# Patient Record
Sex: FEMALE | Race: WHITE | Employment: OTHER | URBAN - METROPOLITAN AREA
[De-identification: names, ages, dates, MRNs, and addresses within clinical notes are randomized per-mention and may not be internally consistent; named-entity substitution may affect disease eponyms.]

---

## 2023-09-19 ENCOUNTER — HOSPITAL ENCOUNTER (OUTPATIENT)
Dept: DATA CONVERSION | Facility: HOSPITAL | Age: 68
Discharge: HOME | End: 2023-09-19

## 2023-09-19 DIAGNOSIS — M76.61 ACHILLES TENDINITIS, RIGHT LEG: ICD-10-CM

## 2023-09-19 DIAGNOSIS — M92.61 JUVENILE OSTEOCHONDROSIS OF TARSUS, RIGHT ANKLE: ICD-10-CM

## 2023-09-19 LAB
ALBUMIN SERPL-MCNC: 3.8 GM/DL (ref 3.5–5)
ALBUMIN/GLOB SERPL: 1 RATIO (ref 1.5–3)
ALP BLD-CCNC: 127 U/L (ref 35–125)
ALT SERPL-CCNC: 69 U/L (ref 5–40)
ANION GAP SERPL CALCULATED.3IONS-SCNC: 8 MMOL/L (ref 0–19)
ANTICOAGULANT: NORMAL
APTT PPP: 23.9 SEC (ref 22–32.5)
AST SERPL-CCNC: 69 U/L (ref 5–40)
BASOPHILS # BLD AUTO: 0.04 K/UL (ref 0–0.22)
BASOPHILS NFR BLD AUTO: 0.7 % (ref 0–1)
BILIRUB SERPL-MCNC: 1.1 MG/DL (ref 0.1–1.2)
BUN SERPL-MCNC: 9 MG/DL (ref 8–25)
BUN/CREAT SERPL: 10 RATIO (ref 8–21)
CALCIUM SERPL-MCNC: 9.4 MG/DL (ref 8.5–10.4)
CHLORIDE SERPL-SCNC: 103 MMOL/L (ref 97–107)
CO2 SERPL-SCNC: 28 MMOL/L (ref 24–31)
CREAT SERPL-MCNC: 0.9 MG/DL (ref 0.4–1.6)
DEPRECATED RDW RBC AUTO: 47.8 FL (ref 37–54)
DIFFERENTIAL METHOD BLD: ABNORMAL
EOSINOPHIL # BLD AUTO: 0.03 K/UL (ref 0–0.45)
EOSINOPHIL NFR BLD: 0.5 % (ref 0–3)
ERYTHROCYTE [DISTWIDTH] IN BLOOD BY AUTOMATED COUNT: 12.8 % (ref 11.7–15)
GFR SERPL CREATININE-BSD FRML MDRD: 71 ML/MIN/1.73 M2
GLOBULIN SER-MCNC: 3.7 G/DL (ref 1.9–3.7)
GLUCOSE SERPL-MCNC: 113 MG/DL (ref 65–99)
HCT VFR BLD AUTO: 46.2 % (ref 36–44)
HGB BLD-MCNC: 15.3 GM/DL (ref 12–15)
IMM GRANULOCYTES # BLD AUTO: 0 K/UL (ref 0–0.1)
INR PPP: 1 (ref 0.86–1.16)
LYMPHOCYTES # BLD AUTO: 1.46 K/UL (ref 1.2–3.2)
LYMPHOCYTES NFR BLD MANUAL: 24.3 % (ref 20–40)
MCH RBC QN AUTO: 33.2 PG (ref 26–34)
MCHC RBC AUTO-ENTMCNC: 33.1 % (ref 31–37)
MCV RBC AUTO: 100.2 FL (ref 80–100)
MONOCYTES # BLD AUTO: 0.49 K/UL (ref 0–0.8)
MONOCYTES NFR BLD MANUAL: 8.2 % (ref 0–8)
NEUTROPHILS # BLD AUTO: 3.99 K/UL
NEUTROPHILS # BLD AUTO: 3.99 K/UL (ref 1.8–7.7)
NEUTROPHILS.IMMATURE NFR BLD: 0 % (ref 0–1)
NEUTS SEG NFR BLD: 66.3 % (ref 50–70)
PLATELET # BLD AUTO: 176 K/UL (ref 150–450)
PMV BLD AUTO: 11.4 CU (ref 7–12.6)
POTASSIUM SERPL-SCNC: 4.7 MMOL/L (ref 3.4–5.1)
PROT SERPL-MCNC: 7.5 G/DL (ref 5.9–7.9)
PROTHROMBIN TIME: 10.1 SEC (ref 9.3–12.7)
RBC # BLD AUTO: 4.61 M/UL (ref 4–4.9)
SODIUM SERPL-SCNC: 139 MMOL/L (ref 133–145)
WBC # BLD AUTO: 6 K/UL (ref 4.5–11)

## 2023-09-22 ENCOUNTER — HOSPITAL ENCOUNTER (OUTPATIENT)
Dept: DATA CONVERSION | Facility: HOSPITAL | Age: 68
Discharge: HOME | End: 2023-09-22

## 2023-09-22 DIAGNOSIS — I25.10 ATHEROSCLEROTIC HEART DISEASE OF NATIVE CORONARY ARTERY WITHOUT ANGINA PECTORIS: ICD-10-CM

## 2023-09-22 DIAGNOSIS — E66.01 MORBID (SEVERE) OBESITY DUE TO EXCESS CALORIES (MULTI): ICD-10-CM

## 2023-09-22 DIAGNOSIS — E78.5 HYPERLIPIDEMIA, UNSPECIFIED: ICD-10-CM

## 2023-09-22 DIAGNOSIS — Z87.891 PERSONAL HISTORY OF NICOTINE DEPENDENCE: ICD-10-CM

## 2023-09-22 DIAGNOSIS — I10 ESSENTIAL (PRIMARY) HYPERTENSION: ICD-10-CM

## 2023-09-22 DIAGNOSIS — Z95.5 PRESENCE OF CORONARY ANGIOPLASTY IMPLANT AND GRAFT: ICD-10-CM

## 2023-09-22 DIAGNOSIS — M92.61 JUVENILE OSTEOCHONDROSIS OF TARSUS, RIGHT ANKLE: ICD-10-CM

## 2023-09-22 DIAGNOSIS — M76.61 ACHILLES TENDINITIS, RIGHT LEG: ICD-10-CM

## 2023-09-22 DIAGNOSIS — Z91.041 RADIOGRAPHIC DYE ALLERGY STATUS: ICD-10-CM

## 2023-09-22 DIAGNOSIS — Z79.82 LONG TERM (CURRENT) USE OF ASPIRIN: ICD-10-CM

## 2023-10-02 PROBLEM — R94.4 DECREASED GFR: Status: ACTIVE | Noted: 2023-10-02

## 2023-10-02 PROBLEM — L65.9 HAIR LOSS: Status: ACTIVE | Noted: 2023-10-02

## 2023-10-02 PROBLEM — M92.60 HAGLUND'S DEFORMITY: Status: ACTIVE | Noted: 2023-10-02

## 2023-10-02 PROBLEM — E66.9 CLASS 2 OBESITY: Status: ACTIVE | Noted: 2023-10-02

## 2023-10-02 PROBLEM — R80.9 MICROALBUMINURIA: Status: ACTIVE | Noted: 2023-10-02

## 2023-10-02 PROBLEM — E78.5 HYPERLIPIDEMIA: Status: ACTIVE | Noted: 2023-10-02

## 2023-10-02 PROBLEM — E66.812 CLASS 2 OBESITY: Status: ACTIVE | Noted: 2023-10-02

## 2023-10-02 PROBLEM — R53.83 FATIGUE: Status: ACTIVE | Noted: 2023-10-02

## 2023-10-02 PROBLEM — M19.011 PRIMARY OSTEOARTHRITIS OF RIGHT SHOULDER: Status: ACTIVE | Noted: 2023-10-02

## 2023-10-02 PROBLEM — E66.01 MORBID OBESITY (MULTI): Status: ACTIVE | Noted: 2023-10-02

## 2023-10-02 PROBLEM — M18.9 CMC ARTHRITIS, THUMB, DEGENERATIVE: Status: ACTIVE | Noted: 2023-10-02

## 2023-10-02 PROBLEM — E66.9 CLASS 2 OBESITY: Status: RESOLVED | Noted: 2023-10-02 | Resolved: 2023-10-02

## 2023-10-02 PROBLEM — I10 HYPERTENSION: Status: ACTIVE | Noted: 2023-10-02

## 2023-10-02 PROBLEM — L82.1 SEBORRHEIC KERATOSIS: Status: ACTIVE | Noted: 2023-10-02

## 2023-10-02 PROBLEM — E66.812 CLASS 2 OBESITY: Status: RESOLVED | Noted: 2023-10-02 | Resolved: 2023-10-02

## 2023-10-02 RX ORDER — ASPIRIN 81 MG/1
81 TABLET ORAL EVERY 12 HOURS
COMMUNITY

## 2023-10-02 RX ORDER — ONDANSETRON 4 MG/1
4 TABLET, FILM COATED ORAL EVERY 6 HOURS PRN
COMMUNITY
End: 2023-10-04 | Stop reason: ALTCHOICE

## 2023-10-02 RX ORDER — LISINOPRIL 30 MG/1
1 TABLET ORAL DAILY
COMMUNITY
Start: 2018-09-10 | End: 2023-12-05 | Stop reason: WASHOUT

## 2023-10-02 RX ORDER — CARVEDILOL 6.25 MG/1
1 TABLET ORAL 2 TIMES DAILY
COMMUNITY
Start: 2018-09-10 | End: 2024-03-26 | Stop reason: SDUPTHER

## 2023-10-02 RX ORDER — ATORVASTATIN CALCIUM 40 MG/1
1 TABLET, FILM COATED ORAL NIGHTLY
COMMUNITY
Start: 2018-09-10 | End: 2024-05-08 | Stop reason: SDUPTHER

## 2023-10-02 RX ORDER — ACETAMINOPHEN 500 MG
1000 TABLET ORAL EVERY 8 HOURS PRN
COMMUNITY

## 2023-10-04 ENCOUNTER — OFFICE VISIT (OUTPATIENT)
Dept: ORTHOPEDIC SURGERY | Facility: CLINIC | Age: 68
End: 2023-10-04
Payer: MEDICARE

## 2023-10-04 VITALS — BODY MASS INDEX: 43.43 KG/M2 | HEIGHT: 62 IN | WEIGHT: 236 LBS

## 2023-10-04 DIAGNOSIS — M92.60 HAGLUND'S DEFORMITY: ICD-10-CM

## 2023-10-04 PROCEDURE — 99024 POSTOP FOLLOW-UP VISIT: CPT | Performed by: STUDENT IN AN ORGANIZED HEALTH CARE EDUCATION/TRAINING PROGRAM

## 2023-10-04 NOTE — LETTER
October 5, 2023     Faviola Meier MD  [address] [facility] [address]  [place] [address]    Patient: Kathryn Araujo   YOB: 1955   Date of Visit: 10/4/2023       Dear Dr. Faviola Meier MD:    Thank you for referring Kathryn Araujo to me for evaluation. Below are my notes for this consultation.  If you have questions, please do not hesitate to call me. I look forward to following your patient along with you.       Sincerely,     Salvatore Ivey MD      CC: No Recipients  ______________________________________________________________________________________    PRIMARY CARE PHYSICIAN: Faviola Meier MD    ORTHOPAEDIC POSTOPERATIVE VISIT    ASSESSMENT & PLAN    Impression: 68 y.o. 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    Plan:   Overall she is doing very well.  She will be transition to a cam walking boot with wedges today.  She will begin the postoperative protocol for the above.  She understands her postoperative precautions.  She will ice and elevate.  She will modify her weightbearing per the postoperative protocol (nonweightbearing for now).    I reviewed the intraoperative findings with the patient and answered all their questions. I reviewed their postoperative timeline and plan with them. They understand the postoperative precautions and the treatment plan going forward.     Follow-Up: Patient will follow-up 4 weeks, no x-rays    At the end of the visit, all questions were answered in full. The patient is in agreement with the plan and recommendations. They will call the office with any questions/concerns.      Note dictated with [employer] software. Completed without full typed error editing and sent to avoid delay.      SUBJECTIVE    HPI: Kathryn Araujo is a 68 y.o. patient. 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  She denies any pain and has been  adhering to her postoperative precautions.    REVIEW OF SYSTEMS  Constitutional: See HPI for pain assessment, No significant weight loss, recent trauma  Cardiovascular: No chest pain, shortness of breath  Respiratory: No difficulty breathing, cough  Gastrointestinal: No nausea, vomiting, diarrhea, constipation  Musculoskeletal: Noted in HPI, positive for pain, restricted motion, stiffness  Integumentary: No rashes, easy bruising, redness   Neurological: no numbness or tingling in extremities, no gait disturbances   Psychiatric: No mood changes, memory changes, social issues  Heme/Lymph: no excessive swelling, easy bruising, excessive bleeding  ENT: No hearing changes  Eyes: No vision changes    No past medical history on file.     No Known Allergies     Past Surgical History:   Procedure Laterality Date   • ACHILLES TENDON SURGERY     • BREAST LUMPECTOMY Right    • CATARACT EXTRACTION  09/10/2018    Cataract Surgery   •  SECTION, CLASSIC  09/10/2018     Section        No family history on file.     Social History     Socioeconomic History   • Marital status:      Spouse name: Not on file   • Number of children: Not on file   • Years of education: Not on file   • Highest education level: Not on file   Occupational History   • Not on file   Tobacco Use   • Smoking status: Former     Types: Cigarettes   • Smokeless tobacco: Never   Substance and Sexual Activity   • Alcohol use: Yes   • Drug use: Never   • Sexual activity: Not on file   Other Topics Concern   • Not on file   Social History Narrative   • Not on file     Social Determinants of Health     Financial Resource Strain: Not on file   Food Insecurity: Not on file   Transportation Needs: Not on file   Physical Activity: Not on file   Stress: Not on file   Social Connections: Not on file   Intimate Partner Violence: Not on file   Housing Stability: Not on file        CURRENT MEDICATIONS:   Current Outpatient Medications   Medication Sig  "Dispense Refill   • acetaminophen (Tylenol) 500 mg tablet Take 2 tablets (1,000 mg) by mouth every 8 hours if needed for mild pain (1 - 3).     • aspirin 81 mg EC tablet Take 1 tablet (81 mg) by mouth every 12 hours.     • atorvastatin (Lipitor) 40 mg tablet Take 1 tablet (40 mg) by mouth once daily at bedtime.     • carvedilol (Coreg) 6.25 mg tablet Take 1 tablet (6.25 mg) by mouth 2 times a day.     • lisinopril 30 mg tablet Take 1 tablet (30 mg) by mouth once daily.       No current facility-administered medications for this visit.        OBJECTIVE    PHYSICAL EXAM      6/1/2021    10:42 AM 11/10/2021     8:16 AM 4/13/2022     7:44 AM 10/31/2022     2:20 PM 12/5/2022     7:39 AM 7/24/2023     1:21 PM 10/4/2023     2:43 PM   Vitals   Systolic 128 140 124 122  142    Diastolic 74 80 82 64  101    Heart Rate 71 78 79 97  78    Temp 36.2 °C (97.1 °F) 36.3 °C (97.3 °F) 35.6 °C (96 °F) 36.2 °C (97.2 °F)      Resp  18        Height (in) 1.568 m (5' 1.75\") 1.588 m (5' 2.5\") 1.588 m (5' 2.5\") 1.588 m (5' 2.5\") 1.574 m (5' 1.97\")  1.575 m (5' 2\")   Weight (lb) 180 205.5 219.5 226.25 222.66  236   BMI 33.19 kg/m2 36.99 kg/m2 39.51 kg/m2 40.72 kg/m2 40.77 kg/m2  43.16 kg/m2   BSA (m2) 1.89 m2 2.03 m2 2.1 m2 2.13 m2 2.1 m2  2.16 m2   Visit Report       Report      Body mass index is 43.16 kg/m².    General: Well-appearing, no acute distress    Skin intact bilateral upper and lower extremities  No erythema  No warmth    Detailed examination of right ankle demonstrates:  Surgical incisions healing well, Steri-Strips in place  No erythema or warmth  No drainage  No ecchymosis  Resolving swelling, minimal  Gentle ankle range of motion intact with dorsiflexion to approximately 10 degrees short of neutral without significant tension  Achilles tendon is palpable and intact  Negative Gutierrez test  Lower extremity motor grossly intact  L4-S1 sensation intact bilaterally  2+ DP/PT pulses bilaterally  Warm and well-perfused, brisk " capillary refill    IMAGING:   No new imaging      Salvatore Ivey MD  Attending Surgeon    Sports Medicine Orthopaedic Surgery  [facility]

## 2023-10-05 NOTE — PROGRESS NOTES
PRIMARY CARE PHYSICIAN: Faviola Meier MD    ORTHOPAEDIC POSTOPERATIVE VISIT    ASSESSMENT & PLAN    Impression: 68 y.o. 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    Plan:   Overall she is doing very well.  She will be transition to a cam walking boot with wedges today.  She will begin the postoperative protocol for the above.  She understands her postoperative precautions.  She will ice and elevate.  She will modify her weightbearing per the postoperative protocol (nonweightbearing for now).    I reviewed the intraoperative findings with the patient and answered all their questions. I reviewed their postoperative timeline and plan with them. They understand the postoperative precautions and the treatment plan going forward.     Follow-Up: Patient will follow-up 4 weeks, no x-rays    At the end of the visit, all questions were answered in full. The patient is in agreement with the plan and recommendations. They will call the office with any questions/concerns.      Note dictated with [employer]X software. Completed without full typed error editing and sent to avoid delay.      SUBJECTIVE    HPI: Kathryn Araujo is a 68 y.o. patient. 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  She denies any pain and has been adhering to her postoperative precautions.    REVIEW OF SYSTEMS  Constitutional: See HPI for pain assessment, No significant weight loss, recent trauma  Cardiovascular: No chest pain, shortness of breath  Respiratory: No difficulty breathing, cough  Gastrointestinal: No nausea, vomiting, diarrhea, constipation  Musculoskeletal: Noted in HPI, positive for pain, restricted motion, stiffness  Integumentary: No rashes, easy bruising, redness   Neurological: no numbness or tingling in extremities, no gait disturbances   Psychiatric: No mood changes, memory changes, social  issues  Heme/Lymph: no excessive swelling, easy bruising, excessive bleeding  ENT: No hearing changes  Eyes: No vision changes    No past medical history on file.     No Known Allergies     Past Surgical History:   Procedure Laterality Date    ACHILLES TENDON SURGERY      BREAST LUMPECTOMY Right     CATARACT EXTRACTION  09/10/2018    Cataract Surgery     SECTION, CLASSIC  09/10/2018     Section        No family history on file.     Social History     Socioeconomic History    Marital status:      Spouse name: Not on file    Number of children: Not on file    Years of education: Not on file    Highest education level: Not on file   Occupational History    Not on file   Tobacco Use    Smoking status: Former     Types: Cigarettes    Smokeless tobacco: Never   Substance and Sexual Activity    Alcohol use: Yes    Drug use: Never    Sexual activity: Not on file   Other Topics Concern    Not on file   Social History Narrative    Not on file     Social Determinants of Health     Financial Resource Strain: Not on file   Food Insecurity: Not on file   Transportation Needs: Not on file   Physical Activity: Not on file   Stress: Not on file   Social Connections: Not on file   Intimate Partner Violence: Not on file   Housing Stability: Not on file        CURRENT MEDICATIONS:   Current Outpatient Medications   Medication Sig Dispense Refill    acetaminophen (Tylenol) 500 mg tablet Take 2 tablets (1,000 mg) by mouth every 8 hours if needed for mild pain (1 - 3).      aspirin 81 mg EC tablet Take 1 tablet (81 mg) by mouth every 12 hours.      atorvastatin (Lipitor) 40 mg tablet Take 1 tablet (40 mg) by mouth once daily at bedtime.      carvedilol (Coreg) 6.25 mg tablet Take 1 tablet (6.25 mg) by mouth 2 times a day.      lisinopril 30 mg tablet Take 1 tablet (30 mg) by mouth once daily.       No current facility-administered medications for this visit.        OBJECTIVE    PHYSICAL EXAM      2021    10:42  "AM 11/10/2021     8:16 AM 4/13/2022     7:44 AM 10/31/2022     2:20 PM 12/5/2022     7:39 AM 7/24/2023     1:21 PM 10/4/2023     2:43 PM   Vitals   Systolic 128 140 124 122  142    Diastolic 74 80 82 64  101    Heart Rate 71 78 79 97  78    Temp 36.2 °C (97.1 °F) 36.3 °C (97.3 °F) 35.6 °C (96 °F) 36.2 °C (97.2 °F)      Resp  18        Height (in) 1.568 m (5' 1.75\") 1.588 m (5' 2.5\") 1.588 m (5' 2.5\") 1.588 m (5' 2.5\") 1.574 m (5' 1.97\")  1.575 m (5' 2\")   Weight (lb) 180 205.5 219.5 226.25 222.66  236   BMI 33.19 kg/m2 36.99 kg/m2 39.51 kg/m2 40.72 kg/m2 40.77 kg/m2  43.16 kg/m2   BSA (m2) 1.89 m2 2.03 m2 2.1 m2 2.13 m2 2.1 m2  2.16 m2   Visit Report       Report      Body mass index is 43.16 kg/m².    General: Well-appearing, no acute distress    Skin intact bilateral upper and lower extremities  No erythema  No warmth    Detailed examination of right ankle demonstrates:  Surgical incisions healing well, Steri-Strips in place  No erythema or warmth  No drainage  No ecchymosis  Resolving swelling, minimal  Gentle ankle range of motion intact with dorsiflexion to approximately 10 degrees short of neutral without significant tension  Achilles tendon is palpable and intact  Negative Gutierrez test  Lower extremity motor grossly intact  L4-S1 sensation intact bilaterally  2+ DP/PT pulses bilaterally  Warm and well-perfused, brisk capillary refill    IMAGING:   No new imaging      Salvatore Ivey MD  Attending Surgeon    Sports Medicine Orthopaedic Surgery  [facility]         "

## 2023-10-18 ENCOUNTER — EVALUATION (OUTPATIENT)
Dept: PHYSICAL THERAPY | Facility: CLINIC | Age: 68
End: 2023-10-18
Payer: MEDICARE

## 2023-10-18 DIAGNOSIS — R26.9 ALTERED GAIT: ICD-10-CM

## 2023-10-18 DIAGNOSIS — M92.60 HAGLUND'S DEFORMITY: ICD-10-CM

## 2023-10-18 DIAGNOSIS — M79.671 PAIN OF RIGHT HEEL: Primary | ICD-10-CM

## 2023-10-18 PROCEDURE — 97110 THERAPEUTIC EXERCISES: CPT | Mod: GP | Performed by: PHYSICAL THERAPIST

## 2023-10-18 PROCEDURE — 97161 PT EVAL LOW COMPLEX 20 MIN: CPT | Mod: GP | Performed by: PHYSICAL THERAPIST

## 2023-10-18 ASSESSMENT — ENCOUNTER SYMPTOMS
OCCASIONAL FEELINGS OF UNSTEADINESS: 1
LOSS OF SENSATION IN FEET: 0
DEPRESSION: 0

## 2023-10-18 ASSESSMENT — PAIN - FUNCTIONAL ASSESSMENT: PAIN_FUNCTIONAL_ASSESSMENT: 0-10

## 2023-10-18 ASSESSMENT — PATIENT HEALTH QUESTIONNAIRE - PHQ9
2. FEELING DOWN, DEPRESSED OR HOPELESS: NOT AT ALL
1. LITTLE INTEREST OR PLEASURE IN DOING THINGS: NOT AT ALL
SUM OF ALL RESPONSES TO PHQ9 QUESTIONS 1 AND 2: 0

## 2023-10-18 ASSESSMENT — PAIN SCALES - GENERAL: PAINLEVEL_OUTOF10: 2

## 2023-10-18 NOTE — Clinical Note
October 18, 2023     Patient: Kathryn Araujo   YOB: 1955   Date of Visit: 10/18/2023       To Whom it May Concern:    Kathryn Araujo was seen in my clinic on 10/18/2023. She {Return to school/sport:39684}.    If you have any questions or concerns, please don't hesitate to call.         Sincerely,          Ann Contreras, PT        CC: No Recipients

## 2023-10-18 NOTE — PROGRESS NOTES
"Physical Therapy    Physical Therapy Evaluation and Treatment      Patient Name: Kathryn Araujo  MRN: [id number]  Today's Date: 10/18/2023  Time Calculation  Start Time: 1010  Stop Time: 1110  Time Calculation (min): 60 min      Assessment:   Pt presents with decreased right ankle ROM and strength and altered gait following calcaneus excision and achilles repair. Pt needs skilled PT to address above problems.    Plan:  Treatment/Interventions: Cryotherapy, Education/ Instruction, Gait training, Manual therapy, Therapeutic exercises  PT Plan: Skilled PT  PT Frequency: 2 times per week  Duration: 12-20 weks  Onset Date: 09/22/23  Certification Period Start Date: 10/18/23  Certification Period End Date: 01/16/24  Rehab Potential: Excellent  Plan of Care Agreement: Patient    Current Problem:   1. Pain of right heel        2. Benito's deformity  Referral to Physical Therapy    Follow Up In Physical Therapy      3. Altered gait            Subjective    Pt presents s/p right calcaneus excision of Hagland's deformity with achilles repair. Pt has been NWB in boot using scooter for mobility.  Patient's goal: \"Walk without walker or any other device\"  General  Referred By: Dr. Ivey  Precautions:  Precautions  STEADI Fall Risk Score (The score of 4 or more indicates an increased risk of falling): 5  LE Weight Bearing Status: Right Partial Weight Bearing  Post-Surgical Precautions: Other (comment)  Splinting: see Dr. Ivey achilles tendon repair protocol       Pain:  Pain Assessment  Pain Assessment: 0-10  Pain Score: 2 (ranges from 0-2/10)  Pain Location:  (right achilles)  Home Living:   Pt lives with  in 2 story home. Bedroom and bathroom on 2nd floor. Pt is sitting on stairs to ascend/descend.  Prior Level of Function:   Independent    Objective   Cognition:   WNL      Gait  Pt instructed in PWB in boot with 4 heel pads using walker. Pt reported no pain with PWB in boot.       ANKLE     Ankle AROM WFL unless " "documented below  R ankle dorsiflexion: (10°): 0  R ankle plantarflexion: (40°): 40 (resting position)    Right knee AROM  0-130 degrees    Right knee strength  Quads 5/5  Hamstrings 5/5       Outcome Measures:  LEFS 16/80    Treatments:  EXERCISES Date10/18/23 Date Date Date    REPS REPS REPS REPS   Toe spreading 10 X 2      Toe curls 10 X 2      Towel scrunches with toes 10X      Right knee ROM-heel slide 20X      Quad set  20 X 5\"      SLR 10  X      Seated LAQ 10 X 2             Light ankle DF within confines of ROM restriction              Gait training in boot-4 heel pads-PWB Walker 80'                                                                                                                                    CP right ankle 10 mins      HEP                OP EDUCATION:   Access Code: [email or web address]  URL: [email or web address]  Date: 10/18/2023  Prepared by: Vivi Contreras    Exercises  - Toe Spreading  - 2 x daily - 7 x weekly - 1 sets - 10 reps  - Seated Toe Curl  - 2 x daily - 7 x weekly - 2 sets - 10 reps  - Towel Scrunches  - 1-2 x daily - 7 x weekly - 1 sets - 10 reps  - Seated Long Arc Quad  - 1-2 x daily - 7 x weekly - 2 sets - 10 reps  - Supine Heel Slide  - 1 x daily - 7 x weekly - 2 sets - 10 reps  - Small Range Straight Leg Raise (Mirrored)  - 1 x daily - 7 x weekly - 2 sets - 10 reps    Goals:  1.Progress to FWB in boot and wean heel lifts in boot per protocol-6 weeks    2.Discontinue boot and gradually wean into regular supportive shoe-12 weeks    3.Full PROM of right hdwifooj-6-60 weeks    4.Improve LEFS score > 40 to facilitate return to prior level of function-12-16 weeks    5.Pt will be able to ambulate community distances without deviation or pain-12 weeks        "

## 2023-10-18 NOTE — Clinical Note
October 18, 2023     Patient: Kathryn Araujo   YOB: 1955   Date of Visit: 10/18/2023       To Whom It May Concern:    It is my medical opinion that Kathryn Araujo {Work release (duty restriction):66478}.    If you have any questions or concerns, please don't hesitate to call.         Sincerely,        Ann Contreras, PT    CC: No Recipients

## 2023-10-20 ENCOUNTER — TREATMENT (OUTPATIENT)
Dept: PHYSICAL THERAPY | Facility: CLINIC | Age: 68
End: 2023-10-20
Payer: MEDICARE

## 2023-10-20 DIAGNOSIS — R26.9 ALTERED GAIT: ICD-10-CM

## 2023-10-20 DIAGNOSIS — M92.60 HAGLUND'S DEFORMITY: ICD-10-CM

## 2023-10-20 DIAGNOSIS — M79.671 PAIN OF RIGHT HEEL: Primary | ICD-10-CM

## 2023-10-20 PROCEDURE — 97110 THERAPEUTIC EXERCISES: CPT | Mod: GP,CQ

## 2023-10-20 PROCEDURE — 97140 MANUAL THERAPY 1/> REGIONS: CPT | Mod: GP,CQ

## 2023-10-20 ASSESSMENT — PAIN - FUNCTIONAL ASSESSMENT: PAIN_FUNCTIONAL_ASSESSMENT: 0-10

## 2023-10-20 ASSESSMENT — PAIN SCALES - GENERAL: PAINLEVEL_OUTOF10: 2

## 2023-10-20 ASSESSMENT — PAIN DESCRIPTION - DESCRIPTORS: DESCRIPTORS: TIGHTNESS

## 2023-10-20 NOTE — PROGRESS NOTES
"Physical Therapy    Physical Therapy Treatment    Patient Name: Kathryn Araujo  MRN: [id number]  : 1955  Today's Date: 10/20/2023  Time Calculation  Start Time: 1000  Stop Time: 1050  Time Calculation (min): 50 min    Visit: 2  Visit limit: 12   Authorization: 10/20/2023 - 2023.    Assessment:   Patient performed added exercises without complaints of increased right ankle or lower extremity symptoms. Patient presented with increases in right ankle range of motion measurements since last recorded measures. Patient indicates and demonstrates compliance with home exercise program.     Plan:   Continue with right ankle and lower extremity flexibility and strengthening program progressing as tolerated to decrease pain with standing and weight bearing activities.     Patient RTD 2023.  Reassessment needed by 2023.    Current Problem  1. Pain of right heel        2. Benito's deformity  Follow Up In Physical Therapy      3. Altered gait            Subjective   Patient reports no complaints of increased right ankle or lower extremity symptoms with daily activities after last treatment. Patient indicates continues to work on home exercise program daily.      Precautions  Precautions  Precautions Comment: Post Op protocol    Pain  Pain Assessment: 0-10  Pain Score: 2  Pain Location:  (Achilles)  Pain Orientation: Right  Pain Descriptors: Tightness    Objective   Added exercises. See exercise log for specifics.     AROM right ankle  Dorsiflexion = 0 degrees  Plantarflexion = 40 degrees  Inversion = 20 degrees  Eversion = 10 degrees      Outcome Measures:  Other Measures  Lower Extremity Funtional Score (LEFS): 16/80 (At initial evaluation)    Treatments:  EXERCISES Date10/18/23 Date   10/20/2023 Date Date    REPS REPS REPS REPS   Toe spreading 10 X 2 10 x 2     Toe curls 10 X 2 10 x 2     Towel scrunches with toes 10X 20 x     Right knee ROM-heel slide 20X      Quad set  20 X 5\"      SLR 10  X 10 x   " "  Seated LAQ 10 X 2 20 x            Light ankle DF within confines of ROM restriction  10x             Gait training in boot-4 heel pads-PWB Walker 80' ' x 2            Stairs (4)  3 x            Isometric DF, INV, EVR  5\"H x 10 each                                                      PROM right ankle  10 minutes                                               CP right ankle 10 mins -----     HEP  Reviewed           OP EDUCATION:       Goals:   1.Progress to FWB in boot and wean heel lifts in boot per protocol-6 weeks    2.Discontinue boot and gradually wean into regular supportive shoe-12 weeks    3.Full PROM of right syqygmwh-1-72 weeks   10/20/2023 progressing    4.Improve LEFS score > 40 to facilitate return to prior level of function-12-16 weeks    5.Pt will be able to ambulate community distances without deviation or pain-12 weeks    10/20/2023 progressing  "

## 2023-10-23 ENCOUNTER — DOCUMENTATION (OUTPATIENT)
Dept: PHYSICAL THERAPY | Facility: CLINIC | Age: 68
End: 2023-10-23
Payer: MEDICARE

## 2023-10-23 ENCOUNTER — APPOINTMENT (OUTPATIENT)
Dept: PHYSICAL THERAPY | Facility: CLINIC | Age: 68
End: 2023-10-23
Payer: MEDICARE

## 2023-10-23 NOTE — PROGRESS NOTES
Physical Therapy                 Therapy Communication Note    Patient Name: Kathryn Araujo  MRN: [id number]  Today's Date: 10/23/2023     Discipline: Physical Therapy    Missed Visit Reason:  Schedule conflict    Missed Time: Cancel    Comment:

## 2023-10-27 ENCOUNTER — TREATMENT (OUTPATIENT)
Dept: PHYSICAL THERAPY | Facility: CLINIC | Age: 68
End: 2023-10-27
Payer: MEDICARE

## 2023-10-27 DIAGNOSIS — R26.9 ALTERED GAIT: ICD-10-CM

## 2023-10-27 DIAGNOSIS — M79.671 PAIN OF RIGHT HEEL: Primary | ICD-10-CM

## 2023-10-27 DIAGNOSIS — M92.60 HAGLUND'S DEFORMITY: ICD-10-CM

## 2023-10-27 PROCEDURE — 97140 MANUAL THERAPY 1/> REGIONS: CPT | Mod: GP,CQ

## 2023-10-27 PROCEDURE — 97110 THERAPEUTIC EXERCISES: CPT | Mod: GP,CQ

## 2023-10-27 ASSESSMENT — PAIN DESCRIPTION - DESCRIPTORS: DESCRIPTORS: TIGHTNESS

## 2023-10-27 ASSESSMENT — PAIN - FUNCTIONAL ASSESSMENT: PAIN_FUNCTIONAL_ASSESSMENT: 0-10

## 2023-10-27 NOTE — PROGRESS NOTES
"Physical Therapy    Physical Therapy Treatment    Patient Name: Kathryn Araujo  MRN: [id number]  : 1955  Today's Date: 10/27/2023  Time Calculation  Start Time: 0845  Stop Time: 935  Time Calculation (min): 50 min    Visit: 3  Visit limit: 12   Authorization: 10/20/2023 - 2023.    Assessment:   Patient performed added exercises without complaints of increased right ankle or lower extremity symptoms. Patient presented with improved right ankle range of motion measurements since last recorded measures.     Plan:   Continue with right ankle and lower extremity flexibility and strengthening program progressing as tolerated to decrease pain with standing and weight bearing activities.     Patient RTD 2023.  Reassessment needed by 2023.    Current Problem  1. Pain of right heel        2. Benito's deformity  Follow Up In Physical Therapy      3. Altered gait            Subjective   Patient reports overall improvements in right ankle range of motion.      Precautions  Precautions  Precautions Comment: Post Op protocol    Pain  Pain Assessment: 0-10  Pain Location:  (Achilles)  Pain Orientation: Right  Pain Descriptors: Tightness    Objective     AROM right ankle  Dorsiflexion = 5 degrees  Plantarflexion = 47 degrees  Inversion = 30 degrees  Eversion = 13 degrees      Outcome Measures:       Treatments:  EXERCISES Date10/18/23 Date   10/20/2023 Date    10/27/2023 Date    REPS REPS REPS REPS   Toe spreading 10 X 2 10 x 2 HEP    Toe curls 10 X 2 10 x 2 HEP    Towel scrunches with toes 10X 20 x 20x    Right knee ROM-heel slide 20X      Quad set  20 X 5\"      SLR 10  X 10 x HEP    Seated LAQ 10 X 2 20 x HEP           Light ankle DF within confines of ROM restriction  10x  10x           Gait training in boot-4 heel pads-PWB Walker 80' ' x 2            Stairs (4)  3 x            Isometric DF, INV, EVR  5\"H x 10 each 5\"H x 10 each    Rockerboard DF/PF/INV/EVR   25 x 2 each           Bilateral " hamstring curls   25# 3 x 10                                PROM right ankle  10 minutes 10 minutes                                              CP right ankle 10 mins ----- 10 minutes    HEP  Reviewed           OP EDUCATION:       Goals:   1.Progress to FWB in boot and wean heel lifts in boot per protocol-6 weeks    2.Discontinue boot and gradually wean into regular supportive shoe-12 weeks    3.Full PROM of right okuofajr-3-59 weeks   10/27/2023 progressing    4.Improve LEFS score > 40 to facilitate return to prior level of function-12-16 weeks    5.Pt will be able to ambulate community distances without deviation or pain-12 weeks    10/20/2023 progressing

## 2023-10-31 ENCOUNTER — APPOINTMENT (OUTPATIENT)
Dept: PHYSICAL THERAPY | Facility: CLINIC | Age: 68
End: 2023-10-31
Payer: MEDICARE

## 2023-11-03 ENCOUNTER — TREATMENT (OUTPATIENT)
Dept: PHYSICAL THERAPY | Facility: CLINIC | Age: 68
End: 2023-11-03
Payer: MEDICARE

## 2023-11-03 DIAGNOSIS — M92.60 HAGLUND'S DEFORMITY: ICD-10-CM

## 2023-11-03 DIAGNOSIS — M79.671 PAIN OF RIGHT HEEL: Primary | ICD-10-CM

## 2023-11-03 PROCEDURE — 97110 THERAPEUTIC EXERCISES: CPT | Mod: GP | Performed by: PHYSICAL THERAPIST

## 2023-11-03 ASSESSMENT — PAIN SCALES - GENERAL: PAINLEVEL_OUTOF10: 0 - NO PAIN

## 2023-11-03 ASSESSMENT — PAIN - FUNCTIONAL ASSESSMENT: PAIN_FUNCTIONAL_ASSESSMENT: 0-10

## 2023-11-03 NOTE — PROGRESS NOTES
"Physical Therapy    Physical Therapy Treatment    Patient Name: Kathryn Araujo  MRN: [id number]  : 1955  Today's Date: 11/3/2023  Time Calculation  Start Time: 1050  Stop Time: 1200  Time Calculation (min): 70 min    Visit: 4  Visit limit: 12   Authorization: 10/20/2023 - 2023.    Assessment:   Removed one more heel lift today and progressed to FWB in boot. Pt had no complaints of pain with progression of WB and removal of heel lift.     Plan:   Continue with right ankle and lower extremity flexibility and strengthening program per protocol. Will remove another heel lift at next visit.    Patient RTD 2023.  Reassessment needed by 2023.    Current Problem  1. Pain of right heel        2. Benito's deformity  Follow Up In Physical Therapy          Subjective   Pt is 6 weeks post op today. Pt reports that she has been ambulating in boot PWB on right LE, but feels she is putting more than 50% WB through right LE. Pt is allowed to begin FWB today. Pt states that she didn't realize that she was supposed to be removing one heel pad/week.     Precautions  Precautions  Post-Surgical Precautions:  (WBAT in boot starting 11/3/23)  Precautions Comment: Post Op protocol    Pain  Pain Assessment: 0-10  Pain Score: 0 - No pain    Objective     AROM right ankle  Dorsiflexion = 4 degrees  Plantarflexion = 50 degrees  Inversion = 4 degrees  Eversion = 5 degrees           Treatments:  EXERCISES Date10/18/23 Date   10/20/2023 Date    10/27/2023 Date 11/3/23    REPS REPS REPS REPS   Toe spreading 10 X 2 10 x 2 HEP 20X   Toe curls 10 X 2 10 x 2 HEP 20X   Towel scrunches with toes 10X 20 x 20x 20X   Right knee ROM-heel slide 20X      Quad set  20 X 5\"      SLR 10  X 10 x HEP    Seated LAQ 10 X 2 20 x HEP    Nu step with heel push only (in boot)    L3  10 mins   Light ankle DF within confines of ROM restriction  10x  10x 20X          Gait training in boot-4 heel pads-PWB Walker 80' ' x 2  FWB with walker       " "   Stairs (4)  3 x            Isometric DF, INV, EVR  5\"H x 10 each 5\"H x 10 each 5\" hold X 10   Rockerboard DF/PF/INV/EVR   25 x 2 each 25 X 2          Bilateral hamstring curls   25# 3 x 10 25#  3 X 10   Bilat knee extension    15#  3 X 10                        Gentle PROM right ankle  10 minutes 10 minutes                                              CP right ankle 10 mins ----- 10 minutes 10 minutes   HEP  Reviewed           OP EDUCATION:       Goals:   1.Progress to FWB in boot and wean heel lifts in boot per protocol-6 weeks-11/3/23 progressing    2.Discontinue boot and gradually wean into regular supportive shoe-12 weeks    3.Full PROM of right lmtcmjqx-4-22 weeks   10/27/2023 progressing    4.Improve LEFS score > 40 to facilitate return to prior level of function-12-16 weeks    5.Pt will be able to ambulate community distances without deviation or pain-12 weeks    10/20/2023 progressing  "

## 2023-11-06 ENCOUNTER — TREATMENT (OUTPATIENT)
Dept: PHYSICAL THERAPY | Facility: CLINIC | Age: 68
End: 2023-11-06
Payer: MEDICARE

## 2023-11-06 ENCOUNTER — LAB (OUTPATIENT)
Dept: LAB | Facility: LAB | Age: 68
End: 2023-11-06
Payer: MEDICARE

## 2023-11-06 ENCOUNTER — OFFICE VISIT (OUTPATIENT)
Dept: ORTHOPEDIC SURGERY | Facility: CLINIC | Age: 68
End: 2023-11-06
Payer: MEDICARE

## 2023-11-06 ENCOUNTER — OFFICE VISIT (OUTPATIENT)
Dept: PRIMARY CARE | Facility: CLINIC | Age: 68
End: 2023-11-06
Payer: MEDICARE

## 2023-11-06 VITALS
HEART RATE: 82 BPM | BODY MASS INDEX: 42.88 KG/M2 | DIASTOLIC BLOOD PRESSURE: 80 MMHG | HEIGHT: 62 IN | WEIGHT: 233 LBS | SYSTOLIC BLOOD PRESSURE: 126 MMHG

## 2023-11-06 VITALS — BODY MASS INDEX: 42.88 KG/M2 | HEIGHT: 62 IN | WEIGHT: 233 LBS

## 2023-11-06 DIAGNOSIS — I10 HYPERTENSION, UNSPECIFIED TYPE: ICD-10-CM

## 2023-11-06 DIAGNOSIS — M92.60 HAGLUND'S DEFORMITY: ICD-10-CM

## 2023-11-06 DIAGNOSIS — M92.60 HAGLUND'S DEFORMITY: Primary | ICD-10-CM

## 2023-11-06 DIAGNOSIS — Z00.00 ENCOUNTER FOR MEDICARE ANNUAL WELLNESS EXAM: Primary | Chronic | ICD-10-CM

## 2023-11-06 DIAGNOSIS — M79.671 PAIN OF RIGHT HEEL: Primary | ICD-10-CM

## 2023-11-06 DIAGNOSIS — Z78.0 MENOPAUSE: ICD-10-CM

## 2023-11-06 DIAGNOSIS — E78.5 HYPERLIPIDEMIA, UNSPECIFIED HYPERLIPIDEMIA TYPE: ICD-10-CM

## 2023-11-06 PROBLEM — I35.0 AORTIC STENOSIS: Status: ACTIVE | Noted: 2023-11-06

## 2023-11-06 LAB
ALBUMIN SERPL BCP-MCNC: 3.8 G/DL (ref 3.4–5)
ALP SERPL-CCNC: 124 U/L (ref 33–136)
ALT SERPL W P-5'-P-CCNC: 96 U/L (ref 7–45)
ANION GAP SERPL CALC-SCNC: 11 MMOL/L (ref 10–20)
APPEARANCE UR: ABNORMAL
AST SERPL W P-5'-P-CCNC: 87 U/L (ref 9–39)
BASOPHILS # BLD AUTO: 0.05 X10*3/UL (ref 0–0.1)
BASOPHILS NFR BLD AUTO: 0.7 %
BILIRUB SERPL-MCNC: 1.4 MG/DL (ref 0–1.2)
BILIRUB UR STRIP.AUTO-MCNC: NEGATIVE MG/DL
BUN SERPL-MCNC: 15 MG/DL (ref 6–23)
CALCIUM SERPL-MCNC: 9.4 MG/DL (ref 8.6–10.3)
CAOX CRY #/AREA UR COMP ASSIST: ABNORMAL /HPF
CHLORIDE SERPL-SCNC: 103 MMOL/L (ref 98–107)
CHOLEST SERPL-MCNC: 131 MG/DL (ref 0–199)
CHOLESTEROL/HDL RATIO: 2.4
CO2 SERPL-SCNC: 28 MMOL/L (ref 21–32)
COLOR UR: ABNORMAL
CREAT SERPL-MCNC: 0.92 MG/DL (ref 0.5–1.05)
EOSINOPHIL # BLD AUTO: 0.02 X10*3/UL (ref 0–0.7)
EOSINOPHIL NFR BLD AUTO: 0.3 %
ERYTHROCYTE [DISTWIDTH] IN BLOOD BY AUTOMATED COUNT: 13.2 % (ref 11.5–14.5)
GFR SERPL CREATININE-BSD FRML MDRD: 69 ML/MIN/1.73M*2
GLUCOSE SERPL-MCNC: 102 MG/DL (ref 74–99)
GLUCOSE UR STRIP.AUTO-MCNC: NEGATIVE MG/DL
HCT VFR BLD AUTO: 46 % (ref 36–46)
HDLC SERPL-MCNC: 53.7 MG/DL
HGB BLD-MCNC: 14.9 G/DL (ref 12–16)
HYALINE CASTS #/AREA URNS AUTO: ABNORMAL /LPF
IMM GRANULOCYTES # BLD AUTO: 0.01 X10*3/UL (ref 0–0.7)
IMM GRANULOCYTES NFR BLD AUTO: 0.1 % (ref 0–0.9)
KETONES UR STRIP.AUTO-MCNC: NEGATIVE MG/DL
LDLC SERPL CALC-MCNC: 59 MG/DL
LEUKOCYTE ESTERASE UR QL STRIP.AUTO: NEGATIVE
LYMPHOCYTES # BLD AUTO: 1.51 X10*3/UL (ref 1.2–4.8)
LYMPHOCYTES NFR BLD AUTO: 20.8 %
MCH RBC QN AUTO: 33.5 PG (ref 26–34)
MCHC RBC AUTO-ENTMCNC: 32.4 G/DL (ref 32–36)
MCV RBC AUTO: 103 FL (ref 80–100)
MONOCYTES # BLD AUTO: 0.66 X10*3/UL (ref 0.1–1)
MONOCYTES NFR BLD AUTO: 9.1 %
MUCOUS THREADS #/AREA URNS AUTO: ABNORMAL /LPF
NEUTROPHILS # BLD AUTO: 5.02 X10*3/UL (ref 1.2–7.7)
NEUTROPHILS NFR BLD AUTO: 69 %
NITRITE UR QL STRIP.AUTO: NEGATIVE
NON HDL CHOLESTEROL: 77 MG/DL (ref 0–149)
NRBC BLD-RTO: 0 /100 WBCS (ref 0–0)
PH UR STRIP.AUTO: 6 [PH]
PLATELET # BLD AUTO: 192 X10*3/UL (ref 150–450)
POTASSIUM SERPL-SCNC: 4.2 MMOL/L (ref 3.5–5.3)
PROT SERPL-MCNC: 7.2 G/DL (ref 6.4–8.2)
PROT UR STRIP.AUTO-MCNC: ABNORMAL MG/DL
RBC # BLD AUTO: 4.45 X10*6/UL (ref 4–5.2)
RBC # UR STRIP.AUTO: NEGATIVE /UL
RBC #/AREA URNS AUTO: ABNORMAL /HPF
SODIUM SERPL-SCNC: 138 MMOL/L (ref 136–145)
SP GR UR STRIP.AUTO: 1.02
TRIGL SERPL-MCNC: 93 MG/DL (ref 0–149)
TSH SERPL-ACNC: 1.7 MIU/L (ref 0.44–3.98)
UROBILINOGEN UR STRIP.AUTO-MCNC: <2 MG/DL
VLDL: 19 MG/DL (ref 0–40)
WBC # BLD AUTO: 7.3 X10*3/UL (ref 4.4–11.3)
WBC #/AREA URNS AUTO: ABNORMAL /HPF

## 2023-11-06 PROCEDURE — 97110 THERAPEUTIC EXERCISES: CPT | Mod: GP | Performed by: PHYSICAL THERAPIST

## 2023-11-06 PROCEDURE — 1160F RVW MEDS BY RX/DR IN RCRD: CPT | Performed by: STUDENT IN AN ORGANIZED HEALTH CARE EDUCATION/TRAINING PROGRAM

## 2023-11-06 PROCEDURE — 3079F DIAST BP 80-89 MM HG: CPT | Performed by: STUDENT IN AN ORGANIZED HEALTH CARE EDUCATION/TRAINING PROGRAM

## 2023-11-06 PROCEDURE — 80053 COMPREHEN METABOLIC PANEL: CPT

## 2023-11-06 PROCEDURE — 3008F BODY MASS INDEX DOCD: CPT | Performed by: FAMILY MEDICINE

## 2023-11-06 PROCEDURE — 1159F MED LIST DOCD IN RCRD: CPT | Performed by: STUDENT IN AN ORGANIZED HEALTH CARE EDUCATION/TRAINING PROGRAM

## 2023-11-06 PROCEDURE — 1170F FXNL STATUS ASSESSED: CPT | Performed by: FAMILY MEDICINE

## 2023-11-06 PROCEDURE — 84443 ASSAY THYROID STIM HORMONE: CPT

## 2023-11-06 PROCEDURE — 3079F DIAST BP 80-89 MM HG: CPT | Performed by: FAMILY MEDICINE

## 2023-11-06 PROCEDURE — 1036F TOBACCO NON-USER: CPT | Performed by: STUDENT IN AN ORGANIZED HEALTH CARE EDUCATION/TRAINING PROGRAM

## 2023-11-06 PROCEDURE — 80061 LIPID PANEL: CPT

## 2023-11-06 PROCEDURE — 36415 COLL VENOUS BLD VENIPUNCTURE: CPT

## 2023-11-06 PROCEDURE — 1159F MED LIST DOCD IN RCRD: CPT | Performed by: FAMILY MEDICINE

## 2023-11-06 PROCEDURE — 1125F AMNT PAIN NOTED PAIN PRSNT: CPT | Performed by: STUDENT IN AN ORGANIZED HEALTH CARE EDUCATION/TRAINING PROGRAM

## 2023-11-06 PROCEDURE — 3074F SYST BP LT 130 MM HG: CPT | Performed by: FAMILY MEDICINE

## 2023-11-06 PROCEDURE — 1036F TOBACCO NON-USER: CPT | Performed by: FAMILY MEDICINE

## 2023-11-06 PROCEDURE — 1126F AMNT PAIN NOTED NONE PRSNT: CPT | Performed by: FAMILY MEDICINE

## 2023-11-06 PROCEDURE — 99024 POSTOP FOLLOW-UP VISIT: CPT | Performed by: STUDENT IN AN ORGANIZED HEALTH CARE EDUCATION/TRAINING PROGRAM

## 2023-11-06 PROCEDURE — 81001 URINALYSIS AUTO W/SCOPE: CPT

## 2023-11-06 PROCEDURE — 3008F BODY MASS INDEX DOCD: CPT | Performed by: STUDENT IN AN ORGANIZED HEALTH CARE EDUCATION/TRAINING PROGRAM

## 2023-11-06 PROCEDURE — 85025 COMPLETE CBC W/AUTO DIFF WBC: CPT

## 2023-11-06 PROCEDURE — 1160F RVW MEDS BY RX/DR IN RCRD: CPT | Performed by: FAMILY MEDICINE

## 2023-11-06 PROCEDURE — G0439 PPPS, SUBSEQ VISIT: HCPCS | Performed by: FAMILY MEDICINE

## 2023-11-06 PROCEDURE — 3074F SYST BP LT 130 MM HG: CPT | Performed by: STUDENT IN AN ORGANIZED HEALTH CARE EDUCATION/TRAINING PROGRAM

## 2023-11-06 ASSESSMENT — ACTIVITIES OF DAILY LIVING (ADL)
GROCERY_SHOPPING: INDEPENDENT
TAKING_MEDICATION: INDEPENDENT
DRESSING: INDEPENDENT
DOING_HOUSEWORK: INDEPENDENT
MANAGING_FINANCES: INDEPENDENT
BATHING: INDEPENDENT

## 2023-11-06 ASSESSMENT — PAIN SCALES - GENERAL: PAINLEVEL_OUTOF10: 2

## 2023-11-06 ASSESSMENT — PAIN DESCRIPTION - DESCRIPTORS: DESCRIPTORS: ACHING

## 2023-11-06 ASSESSMENT — PAIN - FUNCTIONAL ASSESSMENT: PAIN_FUNCTIONAL_ASSESSMENT: 0-10

## 2023-11-06 NOTE — PROGRESS NOTES
"Subjective   Reason for Visit: Kathryn Araujo is an 68 y.o. female here for a Medicare Wellness visit.     Past Medical, Surgical, and Family History reviewed and updated in chart.    Reviewed all medications by prescribing practitioner or clinical pharmacist (such as prescriptions, OTCs, herbal therapies and supplements) and documented in the medical record.    HPI  Gets cam walker off in 2 weeks.  Patient Care Team:  Faviola Mieer MD as PCP - General (Family Medicine)  Faviola Meier MD as PCP - [employer] PCP     Review of Systems   All other systems reviewed and are negative.      Objective   Vitals:  /80 (BP Location: Left arm, Patient Position: Sitting, BP Cuff Size: Adult)   Pulse 82   Ht 1.575 m (5' 2\")   Wt 106 kg (233 lb)   BMI 42.62 kg/m²       Physical Exam  Constitutional:       Appearance: Normal appearance.   HENT:      Head: Normocephalic.      Right Ear: Tympanic membrane normal.      Nose: Nose normal.      Mouth/Throat:      Mouth: Mucous membranes are moist.   Eyes:      Pupils: Pupils are equal, round, and reactive to light.   Cardiovascular:      Rate and Rhythm: Normal rate and regular rhythm.      Pulses: Normal pulses.   Pulmonary:      Effort: Pulmonary effort is normal.   Abdominal:      General: Abdomen is flat.   Musculoskeletal:         General: Normal range of motion.      Cervical back: Normal range of motion.      Comments: R cam walker in place   Skin:     General: Skin is warm.   Neurological:      Mental Status: She is alert.   Psychiatric:         Mood and Affect: Mood normal.         Assessment/Plan   Problem List Items Addressed This Visit    None    Patient Active Problem List   Diagnosis    Decreased GFR    Fatigue    Hair loss    Hyperlipidemia    Hypertension    Microalbuminuria    Primary osteoarthritis of right shoulder    CMC arthritis, thumb, degenerative    Benito's deformity    Morbid obesity (CMS/HCC)    Seborrheic keratosis    Pain " of right heel    Altered gait    Menopause    Encounter for Medicare annual wellness exam

## 2023-11-06 NOTE — PROGRESS NOTES
"Physical Therapy    Physical Therapy Treatment    Patient Name: Kathryn Araujo  MRN: [id number]  : 1955  Today's Date: 2023  Time Calculation  Start Time: 235  Stop Time: 320  Time Calculation (min): 45 min    Visit: 5  Visit limit: 12   Authorization: 10/20/2023 - 2023.    Assessment:   Overall patient continues to do well. Slight increase in right achilles soreness with progression to FWB and increased walking distances outside of PT.    Plan:   Continue with right ankle and lower extremity flexibility and strengthening program per protocol. Will remove another heel lift at next visit.      Reassessment needed by 2023.    Current Problem  1. Pain of right heel        2. Benito's deformity  Follow Up In Physical Therapy          Subjective   Pt reports that she walked around [facility] this weekend and had two doctor appointments today and had to do a lot of walking which has caused some low grade soreness in right achilles.     Precautions  Precautions  Precautions Comment: See Dr Ivey post op protocol    Pain  Pain Assessment: 0-10  Pain Score: 2  Pain Location:  (right achilles)  Pain Descriptors: Aching    Objective     AROM right ankle  Dorsiflexion = 4 degrees  Plantarflexion = 50 degrees  Inversion = 4 degrees  Eversion = 5 degrees           Treatments:  EXERCISES Date23   Date     REPS   REPS   Toe spreading 10 X 2      Toe curls 10 X 2      Towel scrunches with toes 10X       20X      Quad set  20 X 5\"      SLR 10  X             Nu step with heel push only (in boot) L3  10 minutes      Light ankle DF within confines of ROM restriction              Gait training in boot-4 heel pads-PWB Walker 80'             Stairs (4)              Isometric DF, INV, EVR 10 X 5\"H      Rockerboard DF/PF/INV/EVR 30X ea direction             Bilateral hamstring curls 30#  3 X 10      Bilat knee extension 15#  3 X 10                           Gentle PROM right ankle                                 "                 CP right ankle 10 mins  10 minutes 10 minutes   HEP             OP EDUCATION:       Goals:   1.Progress to FWB in boot and wean heel lifts in boot per protocol-6 weeks-11/3/23 progressing    2.Discontinue boot and gradually wean into regular supportive shoe-12 weeks    3.Full PROM of right catgwjhb-4-47 weeks   10/27/2023 progressing    4.Improve LEFS score > 40 to facilitate return to prior level of function-12-16 weeks    5.Pt will be able to ambulate community distances without deviation or pain-12 weeks    10/20/2023 progressing

## 2023-11-09 NOTE — PROGRESS NOTES
PRIMARY CARE PHYSICIAN: Faviola Meier MD    ORTHOPAEDIC POSTOPERATIVE VISIT    ASSESSMENT & PLAN    Impression: 68 y.o. female 6 weeks postop s/p right calcaneus excision of Benito's deformity and Achilles repair.    Plan:   Overall she is doing very well.  She will continue to progress through the postoperative protocol.  She will continue to remove the wedges with the assistance of physical therapy as the tension in her heel allows.  She will ice and elevate.    I reviewed their postoperative timeline and plan with them. They understand the postoperative precautions and the treatment plan going forward.     Follow-Up: Patient will follow-up 6 weeks, no x-rays    At the end of the visit, all questions were answered in full. The patient is in agreement with the plan and recommendations. They will call the office with any questions/concerns.      Note dictated with [employer] software. Completed without full typed error editing and sent to avoid delay.      SUBJECTIVE    HPI: Kathryn Araujo is a 68 y.o. patient. Kathryn Araujo is now 6 weeks postop s/p right calcaneus excision of Benito's deformity and Achilles repair.  She denies any pain and has been adhering to her postoperative precautions.  She has been working with physical therapy through the postoperative protocol for the above.  No complaints at this time.    REVIEW OF SYSTEMS  Constitutional: See HPI for pain assessment, No significant weight loss, recent trauma  Cardiovascular: No chest pain, shortness of breath  Respiratory: No difficulty breathing, cough  Gastrointestinal: No nausea, vomiting, diarrhea, constipation  Musculoskeletal: Noted in HPI, positive for pain, restricted motion, stiffness  Integumentary: No rashes, easy bruising, redness   Neurological: no numbness or tingling in extremities, no gait disturbances   Psychiatric: No mood changes, memory changes, social issues  Heme/Lymph: no excessive swelling, easy bruising,  excessive bleeding  ENT: No hearing changes  Eyes: No vision changes    No past medical history on file.     No Known Allergies     Past Surgical History:   Procedure Laterality Date    ACHILLES TENDON SURGERY      BREAST LUMPECTOMY Right     CATARACT EXTRACTION  09/10/2018    Cataract Surgery     SECTION, CLASSIC  09/10/2018     Section        No family history on file.     Social History     Socioeconomic History    Marital status:      Spouse name: Not on file    Number of children: Not on file    Years of education: Not on file    Highest education level: Not on file   Occupational History    Not on file   Tobacco Use    Smoking status: Former     Types: Cigarettes     Passive exposure: Past    Smokeless tobacco: Never   Substance and Sexual Activity    Alcohol use: Yes    Drug use: Never    Sexual activity: Not on file   Other Topics Concern    Not on file   Social History Narrative    Not on file     Social Determinants of Health     Financial Resource Strain: Not on file   Food Insecurity: Not on file   Transportation Needs: Not on file   Physical Activity: Not on file   Stress: Not on file   Social Connections: Not on file   Intimate Partner Violence: Not on file   Housing Stability: Not on file        CURRENT MEDICATIONS:   Current Outpatient Medications   Medication Sig Dispense Refill    acetaminophen (Tylenol) 500 mg tablet Take 2 tablets (1,000 mg) by mouth every 8 hours if needed for mild pain (1 - 3).      aspirin 81 mg EC tablet Take 1 tablet (81 mg) by mouth every 12 hours.      atorvastatin (Lipitor) 40 mg tablet Take 1 tablet (40 mg) by mouth once daily at bedtime.      carvedilol (Coreg) 6.25 mg tablet Take 1 tablet (6.25 mg) by mouth 2 times a day.      lisinopril 30 mg tablet Take 1 tablet (30 mg) by mouth once daily.       No current facility-administered medications for this visit.        OBJECTIVE    PHYSICAL EXAM      2022     7:44 AM 10/31/2022     2:20 PM  "12/5/2022     7:39 AM 7/24/2023     1:21 PM 10/4/2023     2:43 PM 11/6/2023     8:14 AM 11/6/2023     2:10 PM   Vitals   Systolic 124 122  142  126    Diastolic 82 64  101  80    Heart Rate 79 97  78  82    Temp 35.6 °C (96 °F) 36.2 °C (97.2 °F)        Height (in) 1.588 m (5' 2.5\") 1.588 m (5' 2.5\") 1.574 m (5' 1.97\")  1.575 m (5' 2\") 1.575 m (5' 2\") 1.575 m (5' 2\")   Weight (lb) 219.5 226.25 222.66  236 233 233   BMI 39.51 kg/m2 40.72 kg/m2 40.77 kg/m2  43.16 kg/m2 42.62 kg/m2 42.62 kg/m2   BSA (m2) 2.1 m2 2.13 m2 2.1 m2  2.16 m2 2.15 m2 2.15 m2   Visit Report     Report Report    Report Report    Report      Body mass index is 42.62 kg/m².    General: Well-appearing, no acute distress    Skin intact bilateral upper and lower extremities  No erythema  No warmth    Detailed examination of right ankle demonstrates:  Surgical incision healing well  No erythema or warmth  No ecchymosis, trace soft tissue swelling  Gentle ankle range of motion intact with dorsiflexion to approximately neutral  Achilles tendon is palpable and intact  Negative Gutierrez test  Lower extremity motor grossly intact  L4-S1 sensation intact bilaterally  2+ DP/PT pulses bilaterally  Warm and well-perfused, brisk capillary refill    IMAGING:   No new imaging      Salvatore Ivey MD  Attending Surgeon    Sports Medicine Orthopaedic Surgery  [facility] [place]  [facility] [employer]         "

## 2023-11-10 ENCOUNTER — TREATMENT (OUTPATIENT)
Dept: PHYSICAL THERAPY | Facility: CLINIC | Age: 68
End: 2023-11-10
Payer: MEDICARE

## 2023-11-10 DIAGNOSIS — M92.60 HAGLUND'S DEFORMITY: ICD-10-CM

## 2023-11-10 DIAGNOSIS — M79.671 PAIN OF RIGHT HEEL: Primary | ICD-10-CM

## 2023-11-10 DIAGNOSIS — R26.9 ALTERED GAIT: ICD-10-CM

## 2023-11-10 PROCEDURE — 97110 THERAPEUTIC EXERCISES: CPT | Mod: GP,CQ

## 2023-11-10 ASSESSMENT — PAIN DESCRIPTION - DESCRIPTORS: DESCRIPTORS: ACHING

## 2023-11-10 ASSESSMENT — PAIN - FUNCTIONAL ASSESSMENT: PAIN_FUNCTIONAL_ASSESSMENT: 0-10

## 2023-11-10 NOTE — PROGRESS NOTES
"Physical Therapy    Physical Therapy Treatment    Patient Name: Kathryn Araujo  MRN: [id number]  : 1955  Today's Date: 11/10/2023  Time Calculation  Start Time: 930  Stop Time:   Time Calculation (min): 53 min    Visit: 6  Visit limit: 12   Authorization: 10/20/2023 - 2023.    Assessment:   Patient performed exercises without complaints of increased right ankle or achilles discomfort. Patient presented with improved range of motion measurements since last recorded measures.     Plan:   Continue with working to improve range of motion and strength of right ankle/lower extremity progressing as per protocol to improve standing and walking activities.     Reassessment needed by 2023.  Patient RTD 2023.    Current Problem  1. Pain of right heel        2. Benito's deformity  Follow Up In Physical Therapy      3. Altered gait            Subjective   Patient reports she removed another wedge on Wednesday and is still adjusting to getting used to standing and walking with 1 less wedge.      Precautions  Precautions  Precautions Comment: See Dr Ivey post op protocol    Pain  Pain Assessment: 0-10  Pain Score:  (1-2/10)  Pain Location:  (Right achilles)  Pain Descriptors: Aching    Objective   AROM right ankle  Dorsiflexion = 6 degrees  Plantarflexion = 50 degrees  Inversion = 10 degrees  Eversion = 7 degrees      Other Measures  Lower Extremity Funtional Score (LEFS): 16/80 (At initial evaluation)    Treatments:  EXERCISES Date23 Date   11/10/2023  Date     REPS      Toe spreading 10 X 2 2 x 10     Toe curls 10 X 2 2 x 10     Towel scrunches with toes 10X 10x      20X      Quad set  20 X 5\"      SLR 10  X             Nu step with heel push only (in boot) L3  10 minutes L4 12 minutes     Light ankle DF within confines of ROM restriction              Gait training in boot-4 heel pads-PWB Walker 80' Walker 100'            Stairs (4)              Isometric DF, INV, EVR 10 X 5\"H 5\"H x 10 each   "   Rockerboard DF/PF/INV/EVR 30X ea direction 30 x each  standing            Bilateral hamstring curls 30#  3 X 10 30# 3 x 15     Bilat knee extension 15#  3 X 10 20# 3 x 15                          Gentle PROM right ankle                                                 CP right ankle 10 mins 10 minutes     HEP             OP EDUCATION:       Goals:   1.Progress to FWB in boot and wean heel lifts in boot per protocol-6 weeks-11/10/23 progressing    2.Discontinue boot and gradually wean into regular supportive shoe-12 weeks    3.Full PROM of right yedgiawy-1-72 weeks   11/10/2023 progressing    4.Improve LEFS score > 40 to facilitate return to prior level of function-12-16 weeks    5.Pt will be able to ambulate community distances without deviation or pain-12 weeks    10/20/2023 progressing

## 2023-11-13 ENCOUNTER — TREATMENT (OUTPATIENT)
Dept: PHYSICAL THERAPY | Facility: CLINIC | Age: 68
End: 2023-11-13
Payer: MEDICARE

## 2023-11-13 DIAGNOSIS — M92.60 HAGLUND'S DEFORMITY: ICD-10-CM

## 2023-11-13 PROCEDURE — 97110 THERAPEUTIC EXERCISES: CPT | Mod: GP | Performed by: PHYSICAL THERAPIST

## 2023-11-13 ASSESSMENT — PAIN SCALES - GENERAL: PAINLEVEL_OUTOF10: 1

## 2023-11-13 ASSESSMENT — PAIN DESCRIPTION - DESCRIPTORS: DESCRIPTORS: ACHING

## 2023-11-13 ASSESSMENT — PAIN - FUNCTIONAL ASSESSMENT: PAIN_FUNCTIONAL_ASSESSMENT: 0-10

## 2023-11-13 NOTE — PROGRESS NOTES
"Physical Therapy    Physical Therapy Treatment/Reassessment    Patient Name: Kathryn Araujo  MRN: [id number]  : 1955  Today's Date: 2023  Time Calculation  Start Time: 1115  Stop Time: 1210  Time Calculation (min): 55 min    Visit: 7  Visit limit: 12   Authorization: 10/20/2023 - 2023.    Assessment:   Right ankle ROM is progressing and patient is weaning from heel lifts without achilles pain. Needs continued PT to wean out of boot and normalize gait pattern, ankle ROM, strength and proprioception.    Plan:   Continue with working to improve range of motion and strength of right ankle/lower extremity progressing as per protocol to improve standing and walking activities.     Reassessment needed by 2023.  Patient RTD 2023.    Current Problem  1. Benito's deformity  Follow Up In Physical Therapy          Subjective   Patient reports she removed the last heel wedge in her boot this morning and so far is feeling good without it. Pt is also using straight cane for ambulation instead of walker.      Precautions  Precautions  Precautions Comment: See Dr Ivey post op protocol    Pain  Pain Assessment: 0-10  Pain Score: 1  Pain Location:  (heel)  Pain Descriptors: Aching    Objective   AROM right ankle  Dorsiflexion = 8 degrees  Plantarflexion = 34 degrees  Inversion = 14 degrees  Eversion = 4 degrees    Gait: Pt ambulates with straight cane and CAM boot.    Outcome Measures:  LEFS 32/80           Treatments:  EXERCISES Date23 Date   11/10/2023  11/13/23 Date     REPS      Toe spreading 10 X 2 2 x 10     Toe curls 10 X 2 2 x 10     Towel scrunches with toes 10X 10x      20X 20X     Quad set  20 X 5\" 20 X 5     SLR 10  X             Nu step with heel push only (in boot) L3  10 minutes L4 12 minutes L4  12 mins    Light ankle DF within confines of ROM restriction              Gait training in boot-4 heel pads-PWB Walker 80' Walker 100' Straight cane 100' with boot           Stairs (4)     " "         Isometric DF, INV, EVR 10 X 5\"H 5\"H x 10 each     Rockerboard DF/PF/INV/EVR 30X ea direction 30 x each  standing 30X ea standing           Bilateral hamstring curls 30#  3 X 10 30# 3 x 15 30#  3 X b15    Bilat knee extension 15#  3 X 10 20# 3 x 15 20#  3 X 15                         Gentle PROM right ankle                                                 CP right ankle 10 mins 10 minutes     HEP             OP EDUCATION:       Goals:   1.Progress to FWB in boot and wean heel lifts in boot per protocol-6 weeks-goal met    2.Discontinue boot and gradually wean into regular supportive shoe-12 weeks    3.Full PROM of right festjaub-3-01 weeks   11/13/2023 progressing    4.Improve LEFS score > 40 to facilitate return to prior level of function-12-16 weeks-11/13/23 progressing    5.Pt will be able to ambulate community distances without deviation or pain-12 weeks    10/20/2023 progressing      "

## 2023-11-27 ENCOUNTER — TREATMENT (OUTPATIENT)
Dept: PHYSICAL THERAPY | Facility: CLINIC | Age: 68
End: 2023-11-27
Payer: MEDICARE

## 2023-11-27 DIAGNOSIS — M92.60 HAGLUND'S DEFORMITY: ICD-10-CM

## 2023-11-27 PROCEDURE — 97110 THERAPEUTIC EXERCISES: CPT | Mod: GP,CQ

## 2023-11-27 ASSESSMENT — PAIN - FUNCTIONAL ASSESSMENT: PAIN_FUNCTIONAL_ASSESSMENT: 0-10

## 2023-11-27 ASSESSMENT — PAIN SCALES - GENERAL: PAINLEVEL_OUTOF10: 2

## 2023-11-27 ASSESSMENT — PAIN DESCRIPTION - DESCRIPTORS: DESCRIPTORS: ACHING;SORE

## 2023-11-27 NOTE — PROGRESS NOTES
"Physical Therapy    Physical Therapy Treatment    Patient Name: Kathryn Araujo  MRN: [id number]  : 1955  Today's Date: 2023  Time Calculation  Start Time: 235  Stop Time: 345  Time Calculation (min): 70 min    Visit: 8  Visit limit: 12   Authorization: 10/20/2023 - 2023.    Assessment:   Pt. was issued a medium heel lift and is out of the boot for the first time. She is responding well to heel lift insert. Feels slightly off balance \"like her left leg is shorter than her right\". Pt. has a difficult time with eversion on the rockerboard because of limited ROM. Pt. was able to perform 4 way Tband exercises smoothly without increased pain. Verbal cueing needed to increase right heel to toe pattern with walking. Pt. ambulates with a cane and minimal limp.    Plan:   Continue with working to improve range of motion and strength of right ankle/lower extremity progressing as per protocol to improve standing and walking activities.     Patient RTD 2023.  MMT for Right ankle next visit    Current Problem  1. Benito's deformity  Follow Up In Physical Therapy          Subjective   Pt. reports increased soreness due to an active weekend with a lot of standing and walking. She has only taken the boot off to relax in her recliner. Pt. has not been using the cane with walking in her home.      Precautions   Precautions  Precautions Comment: See Dr Ivey post op protocol    Pain  Pain Assessment: 0-10  Pain Score: 2  Pain Location: Ankle  Pain Orientation: Right  Pain Descriptors: Aching, Sore    Objective   AROM right ankle  Dorsiflexion = 8 degrees  Plantarflexion = 34 degrees  Inversion = 14 degrees  Eversion = 4 degrees    Gait: Pt ambulates with straight cane and shoe    Transitioned from boot to shoe with heel insert.  Added 4 way Tband exercises  Increased ham curl and knee extension resistance    Outcome Measures:  LEFS 32/80     Treatments:  EXERCISES Date23 Date   11/10/2023  11/13/23 Date " "11/27/23    REPS      Toe spreading 10 X 2 2 x 10     Toe curls 10 X 2 2 x 10     Towel scrunches with toes 10X 10x      20X 20X     Quad set  20 X 5\" 20 X 5     SLR 10  X             Nu step with heel push only (in boot) L3  10 minutes L4 12 minutes L4  12 mins L4 12 mins   Light ankle DF within confines of ROM restriction              Gait training in boot-4 heel pads-PWB Walker 80' Walker 100' Straight cane 100' with boot Straight cane 150' with shoe with v/c          Stairs (4)              Isometric DF, INV, EVR 10 X 5\"H 5\"H x 10 each     Rockerboard DF/PF/INV/EVR 30X ea direction 30 x each  standing 30X ea standing 30X ea standing          Bilateral hamstring curls 30#  3 X 10 30# 3 x 15 30#  3 X b15 35# 3x15   Bilat knee extension 15#  3 X 10 20# 3 x 15 20#  3 X 15 25# 3x15          4 way ankle Tband    Hayes 20 ea           Gentle PROM right ankle                                                 CP right ankle 10 mins 10 minutes  10 minutes   HEP         OP EDUCATION:   How to wean into shoe with heel lift from the boot.     Goals:   1.Progress to FWB in boot and wean heel lifts in boot per protocol-6 weeks-goal met    2.Discontinue boot and gradually wean into regular supportive shoe-12 weeks    3.Full PROM of right qgeovrok-7-97 weeks   11/13/2023 progressing    4.Improve LEFS score > 40 to facilitate return to prior level of function-12-16 weeks-11/13/23 progressing    5.Pt will be able to ambulate community distances without deviation or pain-12 weeks    10/20/2023 progressing    "

## 2023-11-30 ENCOUNTER — TREATMENT (OUTPATIENT)
Dept: PHYSICAL THERAPY | Facility: CLINIC | Age: 68
End: 2023-11-30
Payer: MEDICARE

## 2023-11-30 DIAGNOSIS — M92.60 HAGLUND'S DEFORMITY: ICD-10-CM

## 2023-11-30 DIAGNOSIS — R26.9 ALTERED GAIT: Primary | ICD-10-CM

## 2023-11-30 PROCEDURE — 97110 THERAPEUTIC EXERCISES: CPT | Mod: GP,CQ

## 2023-11-30 ASSESSMENT — PAIN DESCRIPTION - DESCRIPTORS: DESCRIPTORS: SORE;TIGHTNESS;ACHING

## 2023-11-30 ASSESSMENT — PAIN - FUNCTIONAL ASSESSMENT: PAIN_FUNCTIONAL_ASSESSMENT: 0-10

## 2023-11-30 NOTE — PROGRESS NOTES
Physical Therapy    Physical Therapy Treatment    Patient Name: Kathryn Araujo  MRN: [id number]  : 1955  Today's Date: 2023  Time Calculation  Start Time: 0845  Stop Time: 930  Time Calculation (min): 45 min    Visit: 9  Visit limit: 12   Authorization: 10/20/2023 - 2023.    Assessment:   Patient performed exercises without complaints of increased right ankle and lower extremity symptoms. Patient presented with improved right ankle strength measures since last recorded measures. Patient maintained consisted heel to toe gait pattern with ambulation activities. Patient was able to ascend stairs with a reciprocating pattern without difficulty. Patient able to descend 1 step (bottom step) with reciprocating pattern.     Plan:   Continue with working to improve range of motion and strength of right ankle/lower extremity progressing as per protocol to improve standing and walking activities.     Patient RTD 2023.      Current Problem  1. Altered gait        2. Benito's deformity  Follow Up In Physical Therapy          Subjective   Patient indicates has been attempting to ambulate more without boot on at home.      Precautions   Precautions  Precautions Comment: See Dr Ivey post op protocol    Pain  Pain Assessment: 0-10  Pain Score:  (1-2/10)  Pain Location: Ankle  Pain Orientation: Right  Pain Descriptors: Sore, Tightness, Aching    Objective   Strength right ankle  Dorsiflexion = 4+/5  Plantarflexion = 4/5  Inversion = 4/5  Eversion = 4/5    Gait: Pt ambulates with straight cane and shoe      Outcome Measures:  Other Measures  Lower Extremity Funtional Score (LEFS): 32/80 (Taken )  Treatments:  EXERCISES Date   2023 Date     Date     REPS      Toe spreading HEP      Toe curls HEP      Towel scrunches with toes HEP                                  Nu step with heel push only (in boot) L5  12 minutes                    Gait training in boot-4 heel pads-PWB Straight cane 150' with shoe  with v/c             Stairs (4) 6x                    Rockerboard DF/PF/INV/EVR standing 30X ea direction             Bilateral hamstring curls 35#  3 X 20      Bilat knee extension 25#  3 X 15             4 way ankle Tband       Shuttle DLP 5B 3 x 10      Shuttle SLP 3B 3 x 10                                  PROM right ankle              CP right ankle       HEP         OP EDUCATION:   How to wean into shoe with heel lift from the boot.     Goals:   1.Progress to FWB in boot and wean heel lifts in boot per protocol-6 weeks-goal met    2.Discontinue boot and gradually wean into regular supportive shoe-12 weeks    11/30/2023 progressing    3.Full PROM of right zyqjywec-2-02 weeks   11/13/2023 progressing    4.Improve LEFS score > 40 to facilitate return to prior level of function-12-16 weeks-11/13/23 progressing    5.Pt will be able to ambulate community distances without deviation or pain-12 weeks    10/20/2023 progressing

## 2023-12-04 ENCOUNTER — TREATMENT (OUTPATIENT)
Dept: PHYSICAL THERAPY | Facility: CLINIC | Age: 68
End: 2023-12-04
Payer: MEDICARE

## 2023-12-04 DIAGNOSIS — M92.60 HAGLUND'S DEFORMITY: ICD-10-CM

## 2023-12-04 PROCEDURE — 97110 THERAPEUTIC EXERCISES: CPT | Mod: GP,CQ

## 2023-12-04 ASSESSMENT — PAIN - FUNCTIONAL ASSESSMENT: PAIN_FUNCTIONAL_ASSESSMENT: 0-10

## 2023-12-04 ASSESSMENT — PAIN SCALES - GENERAL: PAINLEVEL_OUTOF10: 1

## 2023-12-04 NOTE — PROGRESS NOTES
"Physical Therapy    Physical Therapy Treatment    Patient Name: Kathryn Araujo  MRN: [id number]  : 1955  Today's Date: 2023  Time Calculation  Start Time: 0900  Stop Time: 1000  Time Calculation (min): 60 min    Visit: 10  Visit limit: 12   Authorization: 10/20/2023 - 2023.    Assessment:   Pt. was able to  perform exercise program without an increase in pain. Pt. was able to use a reciprocal pattern ascending stairs but still used a step to pattern descending stairs. Pt. Was able to perform 15 second SLS with support before ankle would fatigue. Demonstrated and taught 4 way tband ankle exercises to be added to HEP.     Plan:   Continue with working to improve range of motion and strength of right ankle/lower extremity progressing as per protocol to improve standing and walking activities.     Patient RTD 2023.    Assess ankle AROM next visit.    Current Problem  1. Benito's deformity  Follow Up In Physical Therapy          Subjective   Pt. Has not been using SPC around her house to ambulate. She does use it when she goes out. PT. Reports more pain in her heal when ascending/descending stairs. She reports periodic use of walking boot for convenience \"it is quicker to put on than her shoe with lift\"     Precautions   Precautions  Precautions Comment: See Dr Ivey post op protocol    Pain  Pain Assessment: 0-10  Pain Score: 1  Pain Location: Ankle  Pain Orientation: Right    Objective   Gait: Pt ambulates with straight cane and shoe    Added Roackerboard balance, Baps board exercises, SLS balance, and step ups.  Increased weight used for HS curls and Quad extensions.   Added 4 way ankle tband to HEP.     Treatments:  EXERCISES Date   2023 Date  23   Date     REPS Reps     Toe spreading HEP HEP     Toe curls HEP HEP     Towel scrunches with toes HEP HEP            SLS Balance  20 sec with support.     Step-ups  15 ea            Nu step with heel push only (in boot) L5  12 minutes L5 " 10 min                   Gait training in boot-4 heel pads-PWB Straight cane 150' with shoe with v/c 2 laps with shoe no cane             Stairs (4) 6x 6x            Baps Board   20x ea LVL 2     Rockerboard DF/PF/INV/EVR standing 30X ea direction 30x ea     Rockerboard balance   1 min PF/DF     Bilateral hamstring curls 35#  3 X 20 45 3x15     Bilat knee extension 25#  3 X 15 30 3x15            4 way ankle Tband  10 ea green      Shuttle DLP 5B 3 x 10 5B 3x10     Shuttle SLP 3B 3 x 10 3B 3x10            SLS                     PROM right ankle              CP right ankle       HEP         OP EDUCATION:   How to wean into shoe with heel lift from the boot.     Goals:   1.Progress to FWB in boot and wean heel lifts in boot per protocol-6 weeks-goal met    2.Discontinue boot and gradually wean into regular supportive shoe-12 weeks    11/30/2023 progressing    3.Full PROM of right fbnoxssi-8-03 weeks   11/13/2023 progressing    4.Improve LEFS score > 40 to facilitate return to prior level of function-12-16 weeks-11/13/23 progressing    5.Pt will be able to ambulate community distances without deviation or pain-12 weeks    10/20/2023 progressing

## 2023-12-05 ENCOUNTER — OFFICE VISIT (OUTPATIENT)
Dept: PRIMARY CARE | Facility: CLINIC | Age: 68
End: 2023-12-05
Payer: MEDICARE

## 2023-12-05 ENCOUNTER — LAB (OUTPATIENT)
Dept: LAB | Facility: LAB | Age: 68
End: 2023-12-05
Payer: MEDICARE

## 2023-12-05 VITALS
HEART RATE: 78 BPM | DIASTOLIC BLOOD PRESSURE: 110 MMHG | TEMPERATURE: 97.4 F | SYSTOLIC BLOOD PRESSURE: 144 MMHG | WEIGHT: 232 LBS | BODY MASS INDEX: 42.43 KG/M2

## 2023-12-05 DIAGNOSIS — R74.8 ABNORMAL LIVER ENZYMES: ICD-10-CM

## 2023-12-05 DIAGNOSIS — I10 HYPERTENSION, UNSPECIFIED TYPE: Primary | ICD-10-CM

## 2023-12-05 PROCEDURE — 1125F AMNT PAIN NOTED PAIN PRSNT: CPT | Performed by: FAMILY MEDICINE

## 2023-12-05 PROCEDURE — 1036F TOBACCO NON-USER: CPT | Performed by: FAMILY MEDICINE

## 2023-12-05 PROCEDURE — 1160F RVW MEDS BY RX/DR IN RCRD: CPT | Performed by: FAMILY MEDICINE

## 2023-12-05 PROCEDURE — 1159F MED LIST DOCD IN RCRD: CPT | Performed by: FAMILY MEDICINE

## 2023-12-05 PROCEDURE — 36415 COLL VENOUS BLD VENIPUNCTURE: CPT

## 2023-12-05 PROCEDURE — 86704 HEP B CORE ANTIBODY TOTAL: CPT

## 2023-12-05 PROCEDURE — 99213 OFFICE O/P EST LOW 20 MIN: CPT | Performed by: FAMILY MEDICINE

## 2023-12-05 PROCEDURE — 86803 HEPATITIS C AB TEST: CPT

## 2023-12-05 PROCEDURE — 3077F SYST BP >= 140 MM HG: CPT | Performed by: FAMILY MEDICINE

## 2023-12-05 PROCEDURE — 86706 HEP B SURFACE ANTIBODY: CPT

## 2023-12-05 PROCEDURE — 3008F BODY MASS INDEX DOCD: CPT | Performed by: FAMILY MEDICINE

## 2023-12-05 PROCEDURE — 87340 HEPATITIS B SURFACE AG IA: CPT

## 2023-12-05 PROCEDURE — 3080F DIAST BP >= 90 MM HG: CPT | Performed by: FAMILY MEDICINE

## 2023-12-05 RX ORDER — LISINOPRIL 40 MG/1
40 TABLET ORAL DAILY
Qty: 30 TABLET | Refills: 5 | Status: SHIPPED | OUTPATIENT
Start: 2023-12-05 | End: 2024-01-04 | Stop reason: SDUPTHER

## 2023-12-05 ASSESSMENT — ENCOUNTER SYMPTOMS: HYPERTENSION: 1

## 2023-12-05 NOTE — PROGRESS NOTES
Subjective   Patient ID: Kathryn Araujo is a 68 y.o. female who presents for Hypertension (High bp, lightheaded, just feel off ).    Hypertension       Has  had chest pressure when bp up and lightheaded and dizzy.   Review of Systems   All other systems reviewed and are negative.      Objective   BP (!) 144/110   Pulse 78   Temp 36.3 °C (97.4 °F)   Wt 105 kg (232 lb)   BMI 42.43 kg/m²     Physical Exam  Constitutional:       Appearance: Normal appearance.   HENT:      Head: Normocephalic.      Right Ear: Tympanic membrane normal.      Nose: Nose normal.      Mouth/Throat:      Mouth: Mucous membranes are moist.   Eyes:      Pupils: Pupils are equal, round, and reactive to light.   Cardiovascular:      Rate and Rhythm: Normal rate and regular rhythm.      Pulses: Normal pulses.   Pulmonary:      Effort: Pulmonary effort is normal.   Abdominal:      General: Abdomen is flat.   Musculoskeletal:         General: Normal range of motion.      Cervical back: Normal range of motion.   Skin:     General: Skin is warm.   Neurological:      Mental Status: She is alert.   Psychiatric:         Mood and Affect: Mood normal.         Assessment/Plan   Diagnoses and all orders for this visit:  Hypertension, unspecified type  -     lisinopril 40 mg tablet; Take 1 tablet (40 mg) by mouth once daily.  Abnormal liver enzymes  -     lisinopril 40 mg tablet; Take 1 tablet (40 mg) by mouth once daily.  -     Hepatitis C antibody; Future  -     Hepatitis B surface antigen; Future  -     Hepatitis B surface antibody; Future  -     Hepatitis B Core Antibody, Total; Future  -     US abdomen complete; Future  Rtc 1 month

## 2023-12-06 LAB
HBV CORE AB SER QL: NONREACTIVE
HBV SURFACE AB SER-ACNC: <3.1 MIU/ML
HBV SURFACE AG SERPL QL IA: NONREACTIVE
HCV AB SER QL: NONREACTIVE

## 2023-12-07 ENCOUNTER — TREATMENT (OUTPATIENT)
Dept: PHYSICAL THERAPY | Facility: CLINIC | Age: 68
End: 2023-12-07
Payer: MEDICARE

## 2023-12-07 DIAGNOSIS — M92.60 HAGLUND'S DEFORMITY: ICD-10-CM

## 2023-12-07 PROCEDURE — 97110 THERAPEUTIC EXERCISES: CPT | Mod: GP,CQ

## 2023-12-07 ASSESSMENT — PAIN SCALES - GENERAL: PAINLEVEL_OUTOF10: 4

## 2023-12-07 ASSESSMENT — PAIN - FUNCTIONAL ASSESSMENT: PAIN_FUNCTIONAL_ASSESSMENT: 0-10

## 2023-12-07 ASSESSMENT — PAIN DESCRIPTION - DESCRIPTORS: DESCRIPTORS: SORE

## 2023-12-07 NOTE — PROGRESS NOTES
"Physical Therapy    Physical Therapy Treatment    Patient Name: Kathryn Araujo  MRN: [id number]  : 1955  Today's Date: 2023  Time Calculation  Start Time: 0800  Stop Time: 0855  Time Calculation (min): 55 min    Visit: 11  Visit limit: 12   Authorization: 10/20/2023 - 2023.    Assessment:    Due to pt. soreness today's exercise program was decreased in intensity. Pt. had no complaints of pain at the end of treatment. Baps board and Rockerboard exercises \"loosen up\" stiffness in her heel. Pt. was able to complete the modified exercise plan. Cold pack at the end of session decreased symptoms.    Plan:   Continue with working to improve range of motion and strength of right ankle/lower extremity progressing as per protocol to improve standing and walking activities.     Patient RTD 2023.    Assess ankle AROM next visit.     Current Problem  1. Benito's deformity  Follow Up In Physical Therapy          Subjective   Pt. is sore this morning. She took off one of the layers from her heel lift a few days ago. She has been attempting SL balance exercises at home which have resulted in an increase in soreness. Pt. stopped doing this exercise at home. Pt. reports muscle soreness from previous visit. Pt. is ambulating with SPC when out of the house but no assistive device while at home.      Precautions    Precautions  Precautions Comment: See Dr Ivey post op protocol      Pain  Pain Assessment: 0-10  Pain Score: 4  Pain Location: Ankle ((heel))  Pain Descriptors: Sore    Objective   AROM right ankle  Dorsiflexion = 5 degrees  Plantarflexion = 47 degrees  Inversion = 30 degrees  Eversion = 13 degrees     Treatments:  EXERCISES Date   2023 Date  23  Date 23 Date     REPS Reps     Toe spreading HEP HEP     Toe curls HEP HEP     Towel scrunches with toes HEP HEP            SLS Balance  20 sec with support. NT    Step-ups  15 ea 15 ea           Nu step with heel push only (in boot) L5  12 " minutes L5 10 min L5 10 min                  Gait training in boot-4 heel pads-PWB Straight cane 150' with shoe with v/c 2 laps with shoe no cane NT            Stairs (4) 6x 6x NT           Baps Board   20x ea LVL 2 L2 20x ea     Rockerboard DF/PF/INV/EVR standing 30X ea direction 30x ea 30X ea    Rockerboard balance   1 min PF/DF 1min PF/DF    Bilateral hamstring curls 35#  3 X 20 45 3x15 35# 3x20    Bilat knee extension 25#  3 X 15 30 3x15 30# 3x20           4 way ankle Tband  10 ea green  HEP    Shuttle DLP 5B 3 x 10 5B 3x10 5B 3x10    Shuttle SLP 3B 3 x 10 3B 3x10 3B 3x10           SLS                     PROM right ankle              CP right ankle   10 min    HEP          Goals:   1.Progress to FWB in boot and wean heel lifts in boot per protocol-6 weeks    2.Discontinue boot and gradually wean into regular supportive shoe-12 weeks    3.Full PROM of right gqltnvrl-7-26 weeks   10/27/2023 progressing    4.Improve LEFS score > 40 to facilitate return to prior level of function-12-16 weeks    5.Pt will be able to ambulate community distances without deviation or pain-12 weeks    10/20/2023 progressing

## 2023-12-08 ENCOUNTER — TELEPHONE (OUTPATIENT)
Dept: PRIMARY CARE | Facility: CLINIC | Age: 68
End: 2023-12-08
Payer: MEDICARE

## 2023-12-11 ENCOUNTER — TREATMENT (OUTPATIENT)
Dept: PHYSICAL THERAPY | Facility: CLINIC | Age: 68
End: 2023-12-11
Payer: MEDICARE

## 2023-12-11 DIAGNOSIS — M92.60 HAGLUND'S DEFORMITY: ICD-10-CM

## 2023-12-11 DIAGNOSIS — M79.671 PAIN OF RIGHT HEEL: Primary | ICD-10-CM

## 2023-12-11 PROCEDURE — 97110 THERAPEUTIC EXERCISES: CPT | Mod: GP | Performed by: PHYSICAL THERAPIST

## 2023-12-11 ASSESSMENT — PAIN - FUNCTIONAL ASSESSMENT: PAIN_FUNCTIONAL_ASSESSMENT: 0-10

## 2023-12-11 ASSESSMENT — PAIN DESCRIPTION - DESCRIPTORS: DESCRIPTORS: SORE

## 2023-12-11 ASSESSMENT — PAIN SCALES - GENERAL: PAINLEVEL_OUTOF10: 1

## 2023-12-11 NOTE — PROGRESS NOTES
Physical Therapy    Physical Therapy Treatment/Reassessment    Patient Name: Kathryn Araujo  MRN: [id number]  : 1955  Today's Date: 2023  Time Calculation  Start Time: 0900  Stop Time: 0950  Time Calculation (min): 50 min    Visit: 12  Visit limit: 12   Authorization: 10/20/2023 - 2023.    Assessment:    Pt is progressing nicely through post op protocol. Continues to have mild ROM deficits and gait deviations as well as decreased proprioception. Will request more visits from insurance company.    Plan:     Request more visits from insurance company to progress PT working on restoring full right ankle ROM, proprioception and normalize gait.     Patient RTD 2023.        Current Problem  1. Pain of right heel  Follow Up In Physical Therapy      2. Benito's deformity  Follow Up In Physical Therapy    Follow Up In Physical Therapy          Subjective   Pt reports that overall her right achilles is doing well. Continues to become stiff after sitting for awhile. Most of  the time her pain level is low 0-1/10, but occasionally it goes up to 4/10.     Precautions  Precautions  Precautions Comment: see dr ievy protocol Precautions  Precautions Comment: See Dr Ivey post op protocol      Pain  Pain Assessment: 0-10  Pain Score: 1  Pain Location:  (achilles)  Pain Descriptors: Sore    Objective   AROM right ankle  Dorsiflexion = 8 degrees  Plantarflexion = 47 degrees  Inversion = 34 degrees  Eversion = 13 degrees    Strength right ankle:  DF 5/5  PF 5/5  INV 5/5  EVER 5/5    Gait: Pt ambulates without device, but continues to have heel lift in right shoe. Right LE is slightly ER during gait and patient had decreased heel strike and push off on right LE.    SLSB left-eyes open-4 seconds     FUNCTIONAL OUTCOME MEASURE:  LEFS-52/80  Treatments:  EXERCISES Date   2023 Date  23  Date 23 Date 23    REPS Reps     Toe spreading HEP HEP     Toe curls HEP HEP     Towel scrunches with toes  HEP HEP            SLS Balance  20 sec with support. NT    Step-ups  15 ea 15 ea           Nu step with heel push only (in boot) L5  12 minutes L5 10 min L5 10 min L5  10 mins                 Gait training in boot-4 heel pads-PWB Straight cane 150' with shoe with v/c 2 laps with shoe no cane NT            Stairs (4) 6x 6x NT           Baps Board   20x ea LVL 2 L2 20x ea  L2 20 ea   Rockerboard DF/PF/INV/EVR standing 30X ea direction 30x ea 30X ea 20x ea   Rockerboard balance   1 min PF/DF 1min PF/DF    Bilateral hamstring curls 35#  3 X 20 45 3x15 35# 3x20 35#    3 X 20   Bilat knee extension 25#  3 X 15 30 3x15 30# 3x20 25#  3 X 20          4 way ankle Tband  10 ea green  HEP    Shuttle DLP 5B 3 x 10 5B 3x10 5B 3x10 5B  3 X 10   Shuttle SLP 3B 3 x 10 3B 3x10 3B 3x10 3B  3 X 10   Shuttle DTR(keep resistance low)    3B  3 X 10   Standing active DF                     PROM right ankle              CP right ankle   10 min    HEP          Goals:   1.Progress to FWB in boot and wean heel lifts in boot per protocol-6 weeks-goal met    2.Discontinue boot and gradually wean into regular supportive shoe-12 weeks-goal met    3.Full PROM of right ifnwigiz-2-52 weeks   12/11/2023 progressing    4.Improve LEFS score > 40 to facilitate return to prior level of function-12-16 weeks-goal met    5.Pt will be able to ambulate community distances without deviation or pain-12 weeks    12/11/2023 progressing

## 2023-12-12 ENCOUNTER — CLINICAL SUPPORT (OUTPATIENT)
Dept: PRIMARY CARE | Facility: CLINIC | Age: 68
End: 2023-12-12
Payer: MEDICARE

## 2023-12-12 DIAGNOSIS — R74.8 ABNORMAL LIVER ENZYMES: ICD-10-CM

## 2023-12-12 PROCEDURE — 76700 US EXAM ABDOM COMPLETE: CPT | Performed by: RADIOLOGY

## 2023-12-13 ENCOUNTER — TELEPHONE (OUTPATIENT)
Dept: PRIMARY CARE | Facility: CLINIC | Age: 68
End: 2023-12-13
Payer: MEDICARE

## 2023-12-13 PROBLEM — K76.0 FATTY LIVER: Status: ACTIVE | Noted: 2023-12-13

## 2023-12-13 NOTE — TELEPHONE ENCOUNTER
----- Message from Faviola Meier MD sent at 12/13/2023 12:33 PM EST -----  Imaging shows most likely fatty liver. Rec low carb low fat diet

## 2023-12-15 ENCOUNTER — TREATMENT (OUTPATIENT)
Dept: PHYSICAL THERAPY | Facility: CLINIC | Age: 68
End: 2023-12-15
Payer: MEDICARE

## 2023-12-15 DIAGNOSIS — M92.60 HAGLUND'S DEFORMITY: ICD-10-CM

## 2023-12-15 DIAGNOSIS — M79.671 PAIN OF RIGHT HEEL: ICD-10-CM

## 2023-12-15 PROCEDURE — 97110 THERAPEUTIC EXERCISES: CPT | Mod: GP,CQ

## 2023-12-15 ASSESSMENT — PAIN - FUNCTIONAL ASSESSMENT: PAIN_FUNCTIONAL_ASSESSMENT: 0-10

## 2023-12-15 ASSESSMENT — PAIN SCALES - GENERAL: PAINLEVEL_OUTOF10: 1

## 2023-12-15 NOTE — PROGRESS NOTES
"Physical Therapy    Physical Therapy Treatment/Reassessment    Patient Name: Kathryn Araujo  MRN: [id number]  : 1955  Today's Date: 12/15/2023  Time Calculation  Start Time: 1015  Stop Time: 1105  Time Calculation (min): 50 min    Visit: 13    Authorization: 10/20/2023 - 2023.    Assessment:    Pt is progressing nicely through post op protocol. Reviewed heel strike and toe off technique.     Plan:     Request more visits from insurance company to progress PT working on restoring full right ankle ROM, proprioception and normalize gait.     Patient RTD 2023.    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 Pain of right heel  Follow Up In Physical Therapy          Subjective   Pt. Began ambulation w/o SPC. She plans to still use it for longer walking occasions such as grocery store. Pt. Reports that her last heel lift is scheduled to come out on Monday. She has been walking around the house without shoes on without pain. Is hopeful to be cleared to drive come Monday.     Precautions    Precautions  Precautions Comment: See Dr Ivey post op protocol    Pain  Pain Assessment: 0-10  Pain Score: 1  Pain Location: Ankle  Pain Orientation: Posterior    Objective   AROM right ankle  Dorsiflexion = 8 degrees  Plantarflexion = 47 degrees  Inversion = 34 degrees  Eversion = 13 degrees    Increased Shuttle resistance     Treatments:  EXERCISES Date 23             Toe spreading       Toe curls       Towel scrunches with toes              SLS Balance 4\" L with support      Step-ups              Nu step with heel push only (in boot) L5  10 mins                    Gait training in boot-4 heel pads-PWB              Stairs (4)              Baps Board  L2 20 ea      Rockerboard DF/PF/INV/EVR standing 20x ea      Rockerboard balance  1' ea      Bilateral hamstring curls 35#    3 X 20      Bilat knee extension 25#  3 X 20             4 way ankle Tband       Shuttle DLP 5B  3 X 10   "    Shuttle SLP 4B  3 X 10      Shuttle DTR(keep resistance low) 4B  3 X 10      Standing active DF                     PROM right ankle              CP right ankle 10 min      HEP          Goals:   1.Progress to FWB in boot and wean heel lifts in boot per protocol-6 weeks-goal met    2.Discontinue boot and gradually wean into regular supportive shoe-12 weeks-goal met    3.Full PROM of right kuglwdex-0-77 weeks   12/11/2023 progressing    4.Improve LEFS score > 40 to facilitate return to prior level of function-12-16 weeks-goal met    5.Pt will be able to ambulate community distances without deviation or pain-12 weeks    12/11/2023 progressing

## 2023-12-18 ENCOUNTER — OFFICE VISIT (OUTPATIENT)
Dept: ORTHOPEDIC SURGERY | Facility: CLINIC | Age: 68
End: 2023-12-18
Payer: MEDICARE

## 2023-12-18 VITALS — WEIGHT: 232 LBS | BODY MASS INDEX: 42.69 KG/M2 | HEIGHT: 62 IN

## 2023-12-18 DIAGNOSIS — M92.60 HAGLUND'S DEFORMITY: Primary | ICD-10-CM

## 2023-12-18 PROCEDURE — 1036F TOBACCO NON-USER: CPT | Performed by: STUDENT IN AN ORGANIZED HEALTH CARE EDUCATION/TRAINING PROGRAM

## 2023-12-18 PROCEDURE — 1159F MED LIST DOCD IN RCRD: CPT | Performed by: STUDENT IN AN ORGANIZED HEALTH CARE EDUCATION/TRAINING PROGRAM

## 2023-12-18 PROCEDURE — 1160F RVW MEDS BY RX/DR IN RCRD: CPT | Performed by: STUDENT IN AN ORGANIZED HEALTH CARE EDUCATION/TRAINING PROGRAM

## 2023-12-18 PROCEDURE — 99024 POSTOP FOLLOW-UP VISIT: CPT | Performed by: STUDENT IN AN ORGANIZED HEALTH CARE EDUCATION/TRAINING PROGRAM

## 2023-12-18 PROCEDURE — 1125F AMNT PAIN NOTED PAIN PRSNT: CPT | Performed by: STUDENT IN AN ORGANIZED HEALTH CARE EDUCATION/TRAINING PROGRAM

## 2023-12-18 PROCEDURE — 3008F BODY MASS INDEX DOCD: CPT | Performed by: STUDENT IN AN ORGANIZED HEALTH CARE EDUCATION/TRAINING PROGRAM

## 2023-12-18 ASSESSMENT — PAIN SCALES - GENERAL: PAINLEVEL_OUTOF10: 1

## 2023-12-18 ASSESSMENT — PAIN - FUNCTIONAL ASSESSMENT: PAIN_FUNCTIONAL_ASSESSMENT: 0-10

## 2023-12-18 NOTE — PROGRESS NOTES
PRIMARY CARE PHYSICIAN: Faviola Meier MD    ORTHOPAEDIC POSTOPERATIVE VISIT    ASSESSMENT & PLAN    Impression: 68 y.o. female 12 weeks postop s/p right calcaneus excision of Benito's deformity and Achilles repair done on 09/22/2023.    Plan:   Overall she is doing very well.  She will continue to progress through the postoperative protocol.  She understands her postoperative precautions.  She will continue working on her strength and range of motion.    I reviewed their postoperative timeline and plan with them. They understand the postoperative precautions and the treatment plan going forward.     Follow-Up: Patient will follow-up 12 weeks, no x-rays    At the end of the visit, all questions were answered in full. The patient is in agreement with the plan and recommendations. They will call the office with any questions/concerns.      Note dictated with Verical software. Completed without full typed error editing and sent to avoid delay.      SUBJECTIVE    HPI: Kathryn Araujo is a 68 y.o. patient and is now 12 weeks postop s/p right calcaneus excision of Benito's deformity and Achilles repair.  She denies any pain and has been adhering to her postoperative precautions.  She has been working with physical therapy through the postoperative protocol for the above.  No complaints at this time.    REVIEW OF SYSTEMS  Constitutional: See HPI for pain assessment, No significant weight loss, recent trauma  Cardiovascular: No chest pain, shortness of breath  Respiratory: No difficulty breathing, cough  Gastrointestinal: No nausea, vomiting, diarrhea, constipation  Musculoskeletal: Noted in HPI, positive for pain, restricted motion, stiffness  Integumentary: No rashes, easy bruising, redness   Neurological: no numbness or tingling in extremities, no gait disturbances   Psychiatric: No mood changes, memory changes, social issues  Heme/Lymph: no excessive swelling, easy bruising, excessive  bleeding  ENT: No hearing changes  Eyes: No vision changes    No past medical history on file.     No Known Allergies     Past Surgical History:   Procedure Laterality Date    ACHILLES TENDON SURGERY      BREAST LUMPECTOMY Right     CATARACT EXTRACTION  09/10/2018    Cataract Surgery     SECTION, CLASSIC  09/10/2018     Section        No family history on file.     Social History     Socioeconomic History    Marital status:      Spouse name: Not on file    Number of children: Not on file    Years of education: Not on file    Highest education level: Not on file   Occupational History    Not on file   Tobacco Use    Smoking status: Former     Types: Cigarettes     Passive exposure: Past    Smokeless tobacco: Never   Substance and Sexual Activity    Alcohol use: Yes    Drug use: Never    Sexual activity: Not on file   Other Topics Concern    Not on file   Social History Narrative    Not on file     Social Determinants of Health     Financial Resource Strain: Not on file   Food Insecurity: Not on file   Transportation Needs: Not on file   Physical Activity: Not on file   Stress: Not on file   Social Connections: Not on file   Intimate Partner Violence: Not on file   Housing Stability: Not on file        CURRENT MEDICATIONS:   Current Outpatient Medications   Medication Sig Dispense Refill    acetaminophen (Tylenol) 500 mg tablet Take 2 tablets (1,000 mg) by mouth every 8 hours if needed for mild pain (1 - 3).      aspirin 81 mg EC tablet Take 1 tablet (81 mg) by mouth every 12 hours.      atorvastatin (Lipitor) 40 mg tablet Take 1 tablet (40 mg) by mouth once daily at bedtime.      carvedilol (Coreg) 6.25 mg tablet Take 1 tablet (6.25 mg) by mouth 2 times a day.      lisinopril 40 mg tablet Take 1 tablet (40 mg) by mouth once daily. 30 tablet 5     No current facility-administered medications for this visit.        OBJECTIVE    PHYSICAL EXAM      10/31/2022     2:20 PM 2022     7:39 AM  "7/24/2023     1:21 PM 10/4/2023     2:43 PM 11/6/2023     8:14 AM 11/6/2023     2:10 PM 12/5/2023     3:59 PM   Vitals   Systolic 122  142  126  144   Diastolic 64  101  80  110   Heart Rate 97  78  82  78   Temp 36.2 °C (97.2 °F)      36.3 °C (97.4 °F)   Height (in) 1.588 m (5' 2.5\") 1.574 m (5' 1.97\")  1.575 m (5' 2\") 1.575 m (5' 2\") 1.575 m (5' 2\")    Weight (lb) 226.25 222.66  236 233 233 232   BMI 40.72 kg/m2 40.77 kg/m2  43.16 kg/m2 42.62 kg/m2 42.62 kg/m2 42.43 kg/m2   BSA (m2) 2.13 m2 2.1 m2  2.16 m2 2.15 m2 2.15 m2 2.14 m2   Visit Report    Report Report    Report Report    Report Report      There is no height or weight on file to calculate BMI.    General: Well-appearing, no acute distress    Skin intact bilateral upper and lower extremities  No erythema  No warmth    Detailed examination of right ankle demonstrates:  Surgical incision healing well  No erythema or warmth  No ecchymosis, trace soft tissue swelling  Gentle ankle range of motion: Dorsiflexion intact past neutral  Achilles tendon is palpable and intact  Negative Gutierrez test  Lower extremity motor grossly intact  L4-S1 sensation intact bilaterally  2+ DP/PT pulses bilaterally  Warm and well-perfused, brisk capillary refill    IMAGING:   No new imaging      Salvatore Ivey MD  Attending Surgeon    Sports Medicine Orthopaedic Surgery  [facility] [place]  [employer]         "

## 2023-12-19 ENCOUNTER — TREATMENT (OUTPATIENT)
Dept: PHYSICAL THERAPY | Facility: CLINIC | Age: 68
End: 2023-12-19
Payer: MEDICARE

## 2023-12-19 DIAGNOSIS — M92.60 HAGLUND'S DEFORMITY: ICD-10-CM

## 2023-12-19 DIAGNOSIS — M79.671 PAIN OF RIGHT HEEL: ICD-10-CM

## 2023-12-19 PROCEDURE — 97110 THERAPEUTIC EXERCISES: CPT | Mod: GP,CQ

## 2023-12-19 ASSESSMENT — PAIN SCALES - GENERAL: PAINLEVEL_OUTOF10: 0 - NO PAIN

## 2023-12-19 ASSESSMENT — PAIN - FUNCTIONAL ASSESSMENT: PAIN_FUNCTIONAL_ASSESSMENT: 0-10

## 2023-12-19 NOTE — PROGRESS NOTES
"Physical Therapy    Physical Therapy Treatment    Patient Name: aKthryn Araujo  MRN: [id number]  : 1955  Today's Date: 2023  Time Calculation  Start Time: 1215  Stop Time: 1305  Time Calculation (min): 50 min    Visit: 14    Authorization: 10/20/2023 - 2023.    Assessment:    Pt. is able to ascend 4 stairs with a reciprocal pattern and one hand assist. She takes one step at a time going down the stairs secondary to increased soreness. Cold pack at the end of her treatment to decrease soreness.    Plan:    Progress PT working on restoring full right ankle ROM, proprioception and normalize gait.       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 Pain of right heel  Follow Up In Physical Therapy          Subjective   Pt.  had an appointment with Dr. Ivey yesterday. He is pleased with her progression. Pt. Is able to drive again. Next follow up with the  is in three months. Pt. removed the last part of the heel lift from her shoe today. Her shoe is much more comfortable.    Precautions    Precautions  Precautions Comment: See Dr Ivey post op protocol    Pain  Pain Assessment: 0-10  Pain Score: 0 - No pain  Pain Location: Ankle  Pain Orientation: Right    Objective   AROM right ankle  Dorsiflexion = 8 degrees  Plantarflexion = 47 degrees  Inversion = 34 degrees  Eversion = 13 degrees    Added forward step ups and 4 stairs       Treatments:  EXERCISES Date 23            Toe spreading       Toe curls       Towel scrunches with toes              SLS Balance 4\" L with support 30\"x1 right     Step-ups  6\"20x each     4 stairs  5x     Nu step with heel push only (in boot) L5  10 mins L5 minutes                   Gait training in boot-4 heel pads-PWB              Stairs (4)              Baps Board  L2 20 ea L2 20x each     Rockerboard DF/PF/INV/EVR standing 20x ea 30x each     Rockerboard balance  1' ea      Bilateral hamstring curls 35#    3 X 20 40# 3x20  "    Bilat knee extension 25#  3 X 20 25# 3x20            4 way ankle Tband       Shuttle DLP 5B  3 X 10 5B 3x10     Shuttle SLP 4B  3 X 10 4B 3x10     Shuttle DTR(keep resistance low) 4B  3 X 10 4B 3x10     Standing active DF                     PROM right ankle              CP right ankle 10 min      HEP          Goals:   1.Progress to FWB in boot and wean heel lifts in boot per protocol-6 weeks-goal met    2.Discontinue boot and gradually wean into regular supportive shoe-12 weeks-goal met    3.Full PROM of right pawvffnf-3-30 weeks   12/11/2023 progressing    4.Improve LEFS score > 40 to facilitate return to prior level of function-12-16 weeks-goal met    5.Pt will be able to ambulate community distances without deviation or pain-12 weeks    12/11/2023 progressing

## 2023-12-22 ENCOUNTER — TREATMENT (OUTPATIENT)
Dept: PHYSICAL THERAPY | Facility: CLINIC | Age: 68
End: 2023-12-22
Payer: MEDICARE

## 2023-12-22 DIAGNOSIS — M92.60 HAGLUND'S DEFORMITY: ICD-10-CM

## 2023-12-22 DIAGNOSIS — M79.671 PAIN OF RIGHT HEEL: ICD-10-CM

## 2023-12-22 PROCEDURE — 97110 THERAPEUTIC EXERCISES: CPT | Mod: GP,CQ | Performed by: PHYSICAL THERAPY ASSISTANT

## 2023-12-22 ASSESSMENT — PAIN - FUNCTIONAL ASSESSMENT: PAIN_FUNCTIONAL_ASSESSMENT: 0-10

## 2023-12-22 ASSESSMENT — PAIN SCALES - GENERAL: PAINLEVEL_OUTOF10: 1

## 2023-12-22 NOTE — PROGRESS NOTES
"Physical Therapy    Physical Therapy Treatment    Patient Name: Kathryn Araujo  MRN: [id number]  : 1955  Today's Date: 2023  Time Calculation  Start Time: 1155  Stop Time: 1240  Time Calculation (min): 45 min    Visit: 15    Authorization: 10/20/2023 - 2023.    Assessment:    Pt completed session without increased  symptoms. Challenged with SLS.     Plan:    Progress PT working on restoring full right ankle ROM, proprioception and normalize gait.       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 Pain of right heel  Follow Up In Physical Therapy          Subjective   Pt reports she is sore from doing a lot of [place] shopping. Reports increased soreness after last session from trying reciprocal stairs. Pt reports she drove today with no issues.   Precautions  Precautions  Precautions Comment: See Protocol  Precautions Comment: See Dr Ivey post op protocol    Pain  Pain Assessment: 0-10  Pain Score: 1  Pain Location: Ankle  Pain Orientation: Right    Objective   Increased resistance on DLP only.    AROM right ankle  Dorsiflexion = 8 degrees  Plantarflexion = 47 degrees  Inversion = 34 degrees  Eversion = 13 degrees     Treatments:  EXERCISES Date 23           Toe spreading       Toe curls       Towel scrunches with toes              SLS Balance 4\" L with support 30\"x1 right 9\", 8\",9\" unsupp    Step-ups  6\"20x each 6\" x 20 each    4 stairs  5x     Nu step with heel push only (in boot) L5  10 mins L5 minutes L5 x 10 min                  Gait training in boot-4 heel pads-PWB              Stairs (4)              Baps Board  L2 20 ea L2 20x each L3 20 x each    Rockerboard DF/PF/INV/EVR standing 20x ea 30x each 30x each    Rockerboard balance  1' ea  1' each    Bilateral hamstring curls 35#    3 X 20 40# 3x20 40# 3 x 20    Bilat knee extension 25#  3 X 20 25# 3x20 25# 3 x 20           4 way ankle Tband       Shuttle DLP 5B  3 X 10 5B 3x10 6B  3 " x 10    Shuttle SLP 4B  3 X 10 4B 3x10 4B 3 x 10    Shuttle DTR(keep resistance low) 4B  3 X 10 4B 3x10 4B 3 x 10    Standing active DF                     PROM right ankle              CP right ankle 10 min  Declined    HEP          Goals:   1.Progress to FWB in boot and wean heel lifts in boot per protocol-6 weeks-goal met    2.Discontinue boot and gradually wean into regular supportive shoe-12 weeks-goal met    3.Full PROM of right gxmatvgk-0-57 weeks   12/11/2023 progressing    4.Improve LEFS score > 40 to facilitate return to prior level of function-12-16 weeks-goal met    5.Pt will be able to ambulate community distances without deviation or pain-12 weeks    12/11/2023 progressing

## 2023-12-27 ENCOUNTER — TREATMENT (OUTPATIENT)
Dept: PHYSICAL THERAPY | Facility: CLINIC | Age: 68
End: 2023-12-27
Payer: MEDICARE

## 2023-12-27 DIAGNOSIS — M92.60 HAGLUND'S DEFORMITY: ICD-10-CM

## 2023-12-27 DIAGNOSIS — M79.671 PAIN OF RIGHT HEEL: ICD-10-CM

## 2023-12-27 PROCEDURE — 97110 THERAPEUTIC EXERCISES: CPT | Mod: GP | Performed by: PHYSICAL THERAPIST

## 2023-12-27 ASSESSMENT — PAIN - FUNCTIONAL ASSESSMENT: PAIN_FUNCTIONAL_ASSESSMENT: 0-10

## 2023-12-27 NOTE — PROGRESS NOTES
"Physical Therapy    Physical Therapy Treatment/Reassessment    Patient Name: Kathryn Araujo  MRN: [id number]  : 1955  Today's Date: 2023  Time Calculation  Start Time: 330  Stop Time: 420  Time Calculation (min): 50 min    Visit: 16    Authorization: 10/20/2023 - 2023.    Assessment:    Pt completed session without increased  symptoms. Challenged with SLS.     Plan:    Progress PT working on restoring full right ankle ROM, proprioception and normalize gait.  Request more visits after next visit.      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 Pain of right heel  Follow Up In Physical Therapy          Subjective   Pt reports that overall her right achilles feels pretty good but she does get sore if she's walking too long or doing a lot of stairs.   Precautions  Precautions  Precautions Comment: See Protocol  Precautions Comment: See Dr Ivey post op protocol    Pain  Pain Assessment: 0-10  Pain Score:  (pain ranges from 0-3/10)  Pain Location:  (achilles)  Pain Orientation: Right    Objective     AROM right ankle  Dorsiflexion = 6 degrees  Plantarflexion = 47 degrees  Inversion = 34 degrees  Eversion = 13 degrees    Strength right ankle:  DF 5/5  PF 5/5  INV 5/5  EVER 5/5    Gait: Pt ambulates without deviation at slower speeds, but gait becomes antalgic when ambulating at faster speeds.     FUNCTIONAL OUTCOME:  LEFS  44/80     Treatments:  EXERCISES Date 23          Toe spreading       Toe curls       Towel scrunches with toes              SLS Balance 4\" L with support 30\"x1 right 9\", 8\",9\" unsupp    Step-ups  6\"20x each 6\" x 20 each    4 stairs  5x     Nu step with heel push only (in boot) L5  10 mins L5 minutes L5 x 10 min L5 10 mins                 Gait training in boot-4 heel pads-PWB              Stairs (4)              Baps Board  L2 20 ea L2 20x each L3 20 x each    Rockerboard DF/PF/INV/EVR standing 20x ea 30x each 30x " each    Rockerboard balance  1' ea  1' each    Bilateral hamstring curls 35#    3 X 20 40# 3x20 40# 3 x 20 40#  3 X 20   Bilat knee extension 25#  3 X 20 25# 3x20 25# 3 x 20           4 way ankle Tband       Shuttle DLP 5B  3 X 10 5B 3x10 6B  3 x 10 6B  3 X 10   Shuttle SLP 4B  3 X 10 4B 3x10 4B 3 x 10 6B  3 X 10   Shuttle DTR(keep resistance low) 4B  3 X 10 4B 3x10 4B 3 x 10 4B  3 X 10   Standing active DF                     PROM right ankle              CP right ankle 10 min  Declined    HEP          Goals:   1.Progress to FWB in boot and wean heel lifts in boot per protocol-6 weeks-goal met    2.Discontinue boot and gradually wean into regular supportive shoe-12 weeks-goal met    3.Full PROM of right adkgiqln-4-13 weeks   12/27/2023 progressing    4.Improve LEFS score > 40 to facilitate return to prior level of function-12-16 weeks-goal met    5.Pt will be able to ambulate community distances without deviation or pain-12 weeks    12/27/2023 progressing

## 2023-12-29 ENCOUNTER — APPOINTMENT (OUTPATIENT)
Dept: PHYSICAL THERAPY | Facility: CLINIC | Age: 68
End: 2023-12-29
Payer: MEDICARE

## 2024-01-02 ENCOUNTER — TREATMENT (OUTPATIENT)
Dept: PHYSICAL THERAPY | Facility: CLINIC | Age: 69
End: 2024-01-02
Payer: MEDICARE

## 2024-01-02 DIAGNOSIS — M92.60 HAGLUND'S DEFORMITY: ICD-10-CM

## 2024-01-02 DIAGNOSIS — M79.671 PAIN OF RIGHT HEEL: Primary | ICD-10-CM

## 2024-01-02 PROCEDURE — 97110 THERAPEUTIC EXERCISES: CPT | Mod: GP,CQ

## 2024-01-02 ASSESSMENT — PAIN - FUNCTIONAL ASSESSMENT: PAIN_FUNCTIONAL_ASSESSMENT: 0-10

## 2024-01-02 ASSESSMENT — PAIN SCALES - GENERAL: PAINLEVEL_OUTOF10: 0 - NO PAIN

## 2024-01-02 NOTE — PROGRESS NOTES
"Physical Therapy    Physical Therapy Treatment    Patient Name: Kathryn Araujo  MRN: [id number]  : 1955  Today's Date: 2024  Time Calculation  Start Time: 1105  Stop Time: 1150  Time Calculation (min): 45 min    Visit: 17    Assessment:   Performed rockerboard taps and balance without UE assist needed. Balance is improving. Going down the stairs is still challenging. No difficulty ascending the stairs.     Plan:    Progress PT working on restoring full right ankle ROM, proprioception and normalize gait.     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 Pain of right heel  Follow Up In Physical Therapy          Subjective   Doing well. No pain today.    Precautions   Precautions Comment: See Dr Ivey post op protocol    Pain  Pain Assessment: 0-10  Pain Score: 0 - No pain  Pain Location: Foot  Pain Orientation: Right    Objective   AROM right ankle  Dorsiflexion = 8 degrees  Plantarflexion = 47 degrees  Inversion = 34 degrees  Eversion = 13 degrees     Treatments:  EXERCISES Date 24                           Towel scrunches with toes              SLS Balance       Step-ups 8\" x20 each      4 stairs 5x      Nu step with heel push only (in boot) L5  10 mins             Eccentric heel raises       Gait training in boot-4 heel pads-PWB              Stairs (4)              Baps Board  L3 30 ea      Rockerboard DF/PF/INV/EVR standing 30x each      Rockerboard balance  1' ea      Bilateral hamstring curls 45#    3 X 20      Bilat knee extension 25#  3 X 20             4 way ankle Tband       Shuttle DLP 5B  3 X 10      Shuttle SLP 4B  3 X 10      Shuttle DTR(keep resistance low) 4B  3 X 10      Standing active DF       Calf stretch 30\"Hx3             PROM right ankle              CP right ankle       HEP          Goals:   1.Progress to FWB in boot and wean heel lifts in boot per protocol-6 weeks-goal met    2.Discontinue boot and gradually wean into regular supportive shoe-12 " weeks-goal met    3.Full PROM of right bpetzzua-0-33 weeks   12/11/2023 progressing    4.Improve LEFS score > 40 to facilitate return to prior level of function-12-16 weeks-goal met    5.Pt will be able to ambulate community distances without deviation or pain-12 weeks    12/11/2023 progressing

## 2024-01-04 ENCOUNTER — OFFICE VISIT (OUTPATIENT)
Dept: PRIMARY CARE | Facility: CLINIC | Age: 69
End: 2024-01-04
Payer: MEDICARE

## 2024-01-04 VITALS
OXYGEN SATURATION: 98 % | BODY MASS INDEX: 42.33 KG/M2 | SYSTOLIC BLOOD PRESSURE: 142 MMHG | DIASTOLIC BLOOD PRESSURE: 82 MMHG | HEIGHT: 62 IN | WEIGHT: 230 LBS | HEART RATE: 78 BPM | TEMPERATURE: 96.9 F

## 2024-01-04 DIAGNOSIS — R74.8 ABNORMAL LIVER ENZYMES: ICD-10-CM

## 2024-01-04 DIAGNOSIS — I10 HYPERTENSION, UNSPECIFIED TYPE: ICD-10-CM

## 2024-01-04 PROCEDURE — 3008F BODY MASS INDEX DOCD: CPT | Performed by: FAMILY MEDICINE

## 2024-01-04 PROCEDURE — 3077F SYST BP >= 140 MM HG: CPT | Performed by: FAMILY MEDICINE

## 2024-01-04 PROCEDURE — 1126F AMNT PAIN NOTED NONE PRSNT: CPT | Performed by: FAMILY MEDICINE

## 2024-01-04 PROCEDURE — 99213 OFFICE O/P EST LOW 20 MIN: CPT | Performed by: FAMILY MEDICINE

## 2024-01-04 PROCEDURE — 3079F DIAST BP 80-89 MM HG: CPT | Performed by: FAMILY MEDICINE

## 2024-01-04 PROCEDURE — 1036F TOBACCO NON-USER: CPT | Performed by: FAMILY MEDICINE

## 2024-01-04 PROCEDURE — 1159F MED LIST DOCD IN RCRD: CPT | Performed by: FAMILY MEDICINE

## 2024-01-04 RX ORDER — LISINOPRIL 40 MG/1
40 TABLET ORAL DAILY
Qty: 30 TABLET | Refills: 5 | Status: SHIPPED | OUTPATIENT
Start: 2024-01-04 | End: 2024-07-02

## 2024-01-04 RX ORDER — CHLORTHALIDONE 25 MG/1
25 TABLET ORAL DAILY
Qty: 30 TABLET | Refills: 1 | Status: SHIPPED | OUTPATIENT
Start: 2024-01-04 | End: 2024-01-26

## 2024-01-04 RX ORDER — BUSPIRONE HYDROCHLORIDE 5 MG/1
TABLET ORAL
COMMUNITY
Start: 2021-06-01

## 2024-01-04 RX ORDER — LISINOPRIL 40 MG/1
40 TABLET ORAL DAILY
Qty: 30 TABLET | Refills: 5 | Status: SHIPPED | OUTPATIENT
Start: 2024-01-04 | End: 2024-01-04 | Stop reason: WASHOUT

## 2024-01-04 NOTE — PROGRESS NOTES
"Subjective   Patient ID: Kathryn Araujo is a 68 y.o. female who presents for No chief complaint on file..    HPI   Since the lisinopril increased she is feeling better but home monitoring shows sbp 140 to 150s dbp 85 to 110. Exercise 2 times a week. Avoiding salt.  Review of Systems   All other systems reviewed and are negative.      Objective   /82 (BP Location: Left arm, Patient Position: Sitting, BP Cuff Size: Adult)   Pulse 78   Temp 36.1 °C (96.9 °F)   Ht 1.575 m (5' 2\")   Wt 104 kg (230 lb)   SpO2 98%   BMI 42.07 kg/m²     Physical Exam  Constitutional:       Appearance: Normal appearance.   HENT:      Head: Normocephalic.      Right Ear: Tympanic membrane normal.      Nose: Nose normal.      Mouth/Throat:      Mouth: Mucous membranes are moist.   Eyes:      Pupils: Pupils are equal, round, and reactive to light.   Cardiovascular:      Rate and Rhythm: Normal rate and regular rhythm.      Pulses: Normal pulses.   Pulmonary:      Effort: Pulmonary effort is normal.   Abdominal:      General: Abdomen is flat.   Musculoskeletal:         General: Normal range of motion.      Cervical back: Normal range of motion.   Skin:     General: Skin is warm.   Neurological:      Mental Status: She is alert.   Psychiatric:         Mood and Affect: Mood normal.         Assessment/Plan   Diagnoses and all orders for this visit:  Abnormal liver enzymes  -     lisinopril 40 mg tablet; Take 1 tablet (40 mg) by mouth once daily.  Hypertension, unspecified type  -     chlorthalidone (Hygroton) 25 mg tablet; Take 1 tablet (25 mg) by mouth once daily.  -     lisinopril 40 mg tablet; Take 1 tablet (40 mg) by mouth once daily.       "

## 2024-01-05 ENCOUNTER — TREATMENT (OUTPATIENT)
Dept: PHYSICAL THERAPY | Facility: CLINIC | Age: 69
End: 2024-01-05
Payer: MEDICARE

## 2024-01-05 DIAGNOSIS — M79.671 PAIN OF RIGHT HEEL: ICD-10-CM

## 2024-01-05 DIAGNOSIS — M92.60 HAGLUND'S DEFORMITY: ICD-10-CM

## 2024-01-05 PROCEDURE — 97110 THERAPEUTIC EXERCISES: CPT | Mod: GP | Performed by: PHYSICAL THERAPIST

## 2024-01-05 ASSESSMENT — PAIN SCALES - GENERAL: PAINLEVEL_OUTOF10: 1

## 2024-01-05 ASSESSMENT — PAIN - FUNCTIONAL ASSESSMENT: PAIN_FUNCTIONAL_ASSESSMENT: 0-10

## 2024-01-05 NOTE — PROGRESS NOTES
"Physical Therapy    Physical Therapy Treatment    Patient Name: Kathryn Araujo  MRN: [id number]  : 1955  Today's Date: 2024  Time Calculation  Start Time: 945  Stop Time: 6  Time Calculation (min): 41 min    Visit: 18  28 (24-3/1/24)    Assessment:   Functional activities continue to improve. Still having difficulty descending stairs with reciprocal pattern due to tightness in achilles.    Plan:    Progress PT working on restoring full right ankle ROM, proprioception and normalize gait.     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 Pain of right heel  Follow Up In Physical Therapy          Subjective   Pt reports that overall her achilles is feeling pretty good. Still gets sore when she walks fast.    Precautions   Precautions Comment: See Dr Ivey post op protocol    Pain  Pain Assessment: 0-10  Pain Score: 1  Pain Location:  (achilles)  Pain Orientation: Right    Objective   AROM right ankle  Dorsiflexion = 8 degrees  Plantarflexion = 47 degrees  Inversion = 34 degrees  Eversion = 13 degrees     Treatments:  EXERCISES Date 24                          Towel scrunches with toes              SLS Balance       Step-ups 8\" x20 each      4 stairs 5x      Nu step with heel push only (in boot) L5  10 mins L5  10 mins            Eccentric heel raises                     Stairs (4)  3X            Baps Board  L3 30 ea      Rockerboard DF/PF/INV/EVR standing 30x each 30X 3a     Rockerboard balance  1' ea 1 min ea     Bilateral hamstring curls 45#    3 X 20 45#  3 X 20     Bilat knee extension 25#  3 X 20 25#  3 X 20            4 way ankle Tband       Shuttle DLP 5B  3 X 10 5B  3 X 10     Shuttle SLP 4B  3 X 10 4B  3 X 10     Shuttle DTR(keep resistance low) 4B  3 X 10 4B  3 X 10     Standing active DF       Calf stretch 30\"Hx3 Prostretch 15\" X 3            PROM right ankle              CP right ankle       HEP          Goals:   1.Progress to FWB in boot and " wean heel lifts in boot per protocol-6 weeks-goal met    2.Discontinue boot and gradually wean into regular supportive shoe-12 weeks-goal met    3.Full PROM of right lzpmqncw-6-14 weeks   12/11/2023 progressing    4.Improve LEFS score > 40 to facilitate return to prior level of function-12-16 weeks-goal met    5.Pt will be able to ambulate community distances without deviation or pain-12 weeks    12/11/2023 progressing

## 2024-01-10 ENCOUNTER — TREATMENT (OUTPATIENT)
Dept: PHYSICAL THERAPY | Facility: CLINIC | Age: 69
End: 2024-01-10
Payer: MEDICARE

## 2024-01-10 DIAGNOSIS — M92.60 HAGLUND'S DEFORMITY: ICD-10-CM

## 2024-01-10 DIAGNOSIS — M79.671 PAIN OF RIGHT HEEL: Primary | ICD-10-CM

## 2024-01-10 PROCEDURE — 97110 THERAPEUTIC EXERCISES: CPT | Mod: GP | Performed by: PHYSICAL THERAPIST

## 2024-01-10 ASSESSMENT — PAIN - FUNCTIONAL ASSESSMENT: PAIN_FUNCTIONAL_ASSESSMENT: 0-10

## 2024-01-10 NOTE — PROGRESS NOTES
"Physical Therapy    Physical Therapy Treatment    Patient Name: Kathryn Araujo  MRN: [id number]  : 1955  Today's Date: 1/10/2024  Time Calculation  Start Time: 245  Stop Time: 331  Time Calculation (min): 46 min    Visit: 19  3/8 (24-3/1/24)    Assessment:   No increase in pain with exercise program today. Pt will continue to take it easy for a few days to allow soreness to subside.    Plan:    Progress PT working on restoring full right ankle ROM, proprioception and normalize gait.     Current Problem  1. Pain of right heel            Subjective   Pt reports that she had increased soreness in right calf over the past few days, seems to be improving gradually.    Precautions  Precautions  Precautions Comment: see protocolPrecautions Comment: See Dr Ivey post op protocol    Pain  Pain Assessment: 0-10  Pain Score:  (1-2)  Pain Location:  (achilles)  Pain Orientation: Right    Objective   AROM right ankle  Dorsiflexion = 8 degrees  Plantarflexion = 47 degrees  Inversion = 34 degrees  Eversion = 13 degrees     Treatments:  EXERCISES Date 1/2/24 1/5/24 1/10/24                         Towel scrunches with toes              SLS Balance       Step-ups 8\" x20 each      4 stairs 5x      Nu step with heel push only (in boot) L5  10 mins L5  10 mins L5 10 mins           Eccentric heel raises              STM right calf and achilles   5 mins    Stairs (4)  3X            Baps Board  L3 30 ea  L3      Rockerboard DF/PF/INV/EVR standing 30x each 30X  30X    Rockerboard balance  1' ea 1 min ea 1 min ea    Bilateral hamstring curls 45#    3 X 20 45#  3 X 20 45#  3 X 20    Bilat knee extension 25#  3 X 20 25#  3 X 20 25#  3 X 20           4 way ankle Tband       Shuttle DLP 5B  3 X 10 5B  3 X 10 5B  3 X 10    Shuttle SLP 4B  3 X 10 4B  3 X 10 4B  3 X 10    Shuttle DTR(keep resistance low) 4B  3 X 10 4B  3 X 10 4B  3 X 10    Standing active DF       Calf stretch 30\"Hx3 Prostretch 15\" X 3 On ground-no prostretch         "   PROM right ankle              CP right ankle       HEP          Goals:   1.Progress to FWB in boot and wean heel lifts in boot per protocol-6 weeks-goal met    2.Discontinue boot and gradually wean into regular supportive shoe-12 weeks-goal met    3.Full PROM of right aacxwksh-8-97 weeks   12/11/2023 progressing    4.Improve LEFS score > 40 to facilitate return to prior level of function-12-16 weeks-goal met    5.Pt will be able to ambulate community distances without deviation or pain-12 weeks    12/11/2023 progressing

## 2024-01-15 ENCOUNTER — TREATMENT (OUTPATIENT)
Dept: PHYSICAL THERAPY | Facility: CLINIC | Age: 69
End: 2024-01-15
Payer: MEDICARE

## 2024-01-15 DIAGNOSIS — M92.60 HAGLUND'S DEFORMITY: ICD-10-CM

## 2024-01-15 DIAGNOSIS — M79.671 PAIN OF RIGHT HEEL: Primary | ICD-10-CM

## 2024-01-15 PROCEDURE — 97110 THERAPEUTIC EXERCISES: CPT | Mod: GP,CQ

## 2024-01-15 ASSESSMENT — PAIN - FUNCTIONAL ASSESSMENT: PAIN_FUNCTIONAL_ASSESSMENT: 0-10

## 2024-01-15 ASSESSMENT — PAIN SCALES - GENERAL: PAINLEVEL_OUTOF10: 2

## 2024-01-15 NOTE — PROGRESS NOTES
"Physical Therapy    Physical Therapy Treatment    Patient Name: Kathryn Araujo  MRN: [id number]  : 1955  Today's Date: 1/15/2024  Time Calculation  Start Time: 1200  Stop Time: 1245  Time Calculation (min): 45 min    Visit: 20  3/8 (24-3/1/24)    Assessment:   Pt. is able to ascend and descend 4 stairs with increased ease. She continues to need to go slower when going down the stairs. Pt. is challenged with R SLS/no UE. Right SLS- 5 seconds, Left SLS- 30 seconds. No increased right heel pain at the end of her session.    Plan:    Progress PT working on restoring full right ankle ROM, proprioception and normalize gait.     Current Problem  1. Pain of right heel            Subjective   Pt. is feeling good today.     Precautions   Precautions Comment: See Dr Ivey post op protocol    Pain  Pain Assessment: 0-10  Pain Score: 2  Pain Orientation: Right    Objective   AROM right ankle  Dorsiflexion = 8 degrees  Plantarflexion = 47 degrees  Inversion = 34 degrees  Eversion = 13 degrees     Treatments:  EXERCISES Date 1/2/24 1/5/24 1/10/24 1/15/24                        Towel scrunches with toes              SLS Balance       Step-ups 8\" x20 each   30\" x1 R/L   4 stairs 5x      Nu step with heel push only (in boot) L5  10 mins L5  10 mins L5 10 mins L5 10 minutes          Eccentric heel raises              STM right calf and achilles   5 mins    Stairs (4)  3X            Baps Board  L3 30 ea  L3  30x    Rockerboard DF/PF/INV/EVR standing 30x each 30X  30X 30x ea   Rockerboard balance  1' ea 1 min ea 1 min ea 1 minute ea   Bilateral hamstring curls 45#    3 X 20 45#  3 X 20 45#  3 X 20 45# 3x20   Bilat knee extension 25#  3 X 20 25#  3 X 20 25#  3 X 20 25# 3x20          SLS        Shuttle DLP 5B  3 X 10 5B  3 X 10 5B  3 X 10 6B 3x15   Shuttle SLP 4B  3 X 10 4B  3 X 10 4B  3 X 10 4B 3x15   Shuttle DTR(keep resistance low) 4B  3 X 10 4B  3 X 10 4B  3 X 10 4B 30x   Standing active DF       Calf stretch 30\"Hx3 " "Prostretch 15\" X 3 On ground-no prostretch 30\"Hx3          PROM right ankle              CP right ankle       HEP          Goals:   1.Progress to FWB in boot and wean heel lifts in boot per protocol-6 weeks-goal met    2.Discontinue boot and gradually wean into regular supportive shoe-12 weeks-goal met    3.Full PROM of right tkyacjzo-3-74 weeks   12/11/2023 progressing    4.Improve LEFS score > 40 to facilitate return to prior level of function-12-16 weeks-goal met    5.Pt will be able to ambulate community distances without deviation or pain-12 weeks    12/11/2023 progressing      "

## 2024-01-17 ENCOUNTER — APPOINTMENT (OUTPATIENT)
Dept: PHYSICAL THERAPY | Facility: CLINIC | Age: 69
End: 2024-01-17
Payer: MEDICARE

## 2024-01-19 ENCOUNTER — TREATMENT (OUTPATIENT)
Dept: PHYSICAL THERAPY | Facility: CLINIC | Age: 69
End: 2024-01-19
Payer: MEDICARE

## 2024-01-19 DIAGNOSIS — M92.60 HAGLUND'S DEFORMITY: ICD-10-CM

## 2024-01-19 DIAGNOSIS — M79.671 PAIN OF RIGHT HEEL: ICD-10-CM

## 2024-01-19 PROCEDURE — 97110 THERAPEUTIC EXERCISES: CPT | Mod: GP,CQ

## 2024-01-19 ASSESSMENT — PAIN - FUNCTIONAL ASSESSMENT: PAIN_FUNCTIONAL_ASSESSMENT: 0-10

## 2024-01-19 ASSESSMENT — PAIN SCALES - GENERAL: PAINLEVEL_OUTOF10: 0 - NO PAIN

## 2024-01-19 NOTE — PROGRESS NOTES
"Physical Therapy    Physical Therapy Treatment    Patient Name: Kathryn Araujo  MRN: [id number]  : 1955  Today's Date: 2024  Time Calculation  Start Time: 1105  Stop Time: 1150  Time Calculation (min): 45 min    Visit: 21  3/8 (24-3/1/24)    Assessment:   Pt. is able to ascend and descend 4 stairs with increased ease and no pain.   Plan:    Progress PT working on restoring full right ankle ROM, proprioception and normalize gait.     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 Pain of right heel  Follow Up In Physical Therapy          Subjective   Pt. is walking longer distances without right foot pain or discomfort. She wants to start going to her local gym soon.     Precautions   Precautions Comment: See Dr Ivey post op protocol    Pain  Pain Assessment: 0-10  Pain Score: 0 - No pain  Pain Location: Foot  Pain Orientation: Right    Objective   AROM right ankle  Dorsiflexion = 8 degrees  Plantarflexion = 47 degrees  Inversion = 34 degrees  Eversion = 13 degrees    Added Qhip flexion and abduction    Gait - no antalgic limp     Treatments:  EXERCISES Date 24                           Towel scrunches with toes              SLS Balance       Step-ups 8\" x20 each      4 stairs 5x      Nu step with heel push only (in boot) L5  10 mins             Eccentric heel raises              STM right calf and achilles       Stairs (4)              Baps Board  L3 30 ea      Rockerboard DF/PF/INV/EVR standing 30x each      Rockerboard balance        Bilateral hamstring curls 45#    3 X 20      Bilat knee extension 25#  3 X 20      Qhip flexion 30# 2x10      Qhip abduction 30# 2x10      Shuttle DLP 6B  3 X 15      Shuttle SLP 4B  3 X 15      Shuttle DTR(keep resistance low) 4B  30x      Standing active DF       Calf stretch 30\"Hx3             PROM right ankle              CP right ankle       HEP          Goals:   1.Progress to FWB in boot and wean heel lifts in boot per " protocol-6 weeks-goal met    2.Discontinue boot and gradually wean into regular supportive shoe-12 weeks-goal met    3.Full PROM of right tgfglrjk-9-79 weeks   12/11/2023 progressing    4.Improve LEFS score > 40 to facilitate return to prior level of function-12-16 weeks-goal met    5.Pt will be able to ambulate community distances without deviation or pain-12 weeks    12/11/2023 progressing

## 2024-01-22 ENCOUNTER — TREATMENT (OUTPATIENT)
Dept: PHYSICAL THERAPY | Facility: CLINIC | Age: 69
End: 2024-01-22
Payer: MEDICARE

## 2024-01-22 DIAGNOSIS — M92.60 HAGLUND'S DEFORMITY: ICD-10-CM

## 2024-01-22 DIAGNOSIS — M79.671 PAIN OF RIGHT HEEL: ICD-10-CM

## 2024-01-22 PROCEDURE — 97110 THERAPEUTIC EXERCISES: CPT | Mod: GP,CQ

## 2024-01-22 ASSESSMENT — PAIN - FUNCTIONAL ASSESSMENT: PAIN_FUNCTIONAL_ASSESSMENT: 0-10

## 2024-01-22 ASSESSMENT — PAIN SCALES - GENERAL: PAINLEVEL_OUTOF10: 0 - NO PAIN

## 2024-01-22 NOTE — PROGRESS NOTES
"Physical Therapy    Physical Therapy Treatment    Patient Name: Kathryn Araujo  MRN: [id number]  : 1955  Today's Date: 2024  Time Calculation  Start Time: 245  Stop Time: 330  Time Calculation (min): 45 min    Visit: 22  3/8 (24-3/1/24)    Assessment:   Gait and balance are improving nicely.  Plan:    Progress PT working on restoring full right ankle ROM, proprioception and normalize gait.     MMT right ankle.    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 Pain of right heel  Follow Up In Physical Therapy          Subjective   Pts. ankle is tight but not painful.    Precautions   Precautions Comment: See Dr Ivey post op protocol    Pain  Pain Assessment: 0-10  Pain Score: 0 - No pain  Pain Location: Ankle  Pain Orientation: Right    Objective   AROM right ankle  Dorsiflexion = 8 degrees  Plantarflexion = 47 degrees  Inversion = 34 degrees  Eversion = 13 degrees    Gait - no antalgic limp    Right SLS - 30 seconds     Treatments:  EXERCISES Date 24                          Towel scrunches with toes              SLS Balance       Step-ups 8\" x20 each      4 stairs 5x 8x no UE     Nu step with heel push only (in boot) L5  10 mins L5 10 minutes            Eccentric heel raises              STM right calf and achilles       Stairs (4)              Baps Board  L3 30 ea L3 30     Rockerboard DF/PF/INV/EVR standing 30x each 30x each     Rockerboard balance        Bilateral hamstring curls 45#    3 X 20 45# 3x20     Bilat knee extension 25#  3 X 20      Qhip flexion 30# 2x10 30# 2x10     Qhip abduction 30# 2x10 30#2x10     Shuttle DLP 6B  3 X 15 6B 3x15     Shuttle SLP 4B  3 X 15 4B 3x15     Shuttle DTR(keep resistance low) 4B  30x 4B30x     Standing active DF       Calf stretch 30\"Hx3 30\"H x3            PROM right ankle              CP right ankle       HEP          Goals:   1.Progress to FWB in boot and wean heel lifts in boot per protocol-6 weeks-goal " met    2.Discontinue boot and gradually wean into regular supportive shoe-12 weeks-goal met    3.Full PROM of right gnikhcjb-0-55 weeks   12/11/2023 progressing    4.Improve LEFS score > 40 to facilitate return to prior level of function-12-16 weeks-goal met    5.Pt will be able to ambulate community distances without deviation or pain-12 weeks    12/11/2023 progressing

## 2024-01-24 ENCOUNTER — TREATMENT (OUTPATIENT)
Dept: PHYSICAL THERAPY | Facility: CLINIC | Age: 69
End: 2024-01-24
Payer: MEDICARE

## 2024-01-24 DIAGNOSIS — M79.671 PAIN OF RIGHT HEEL: ICD-10-CM

## 2024-01-24 DIAGNOSIS — M92.60 HAGLUND'S DEFORMITY: ICD-10-CM

## 2024-01-24 PROCEDURE — 97110 THERAPEUTIC EXERCISES: CPT | Mod: GP | Performed by: PHYSICAL THERAPIST

## 2024-01-24 ASSESSMENT — PAIN SCALES - GENERAL: PAINLEVEL_OUTOF10: 0 - NO PAIN

## 2024-01-24 ASSESSMENT — PAIN - FUNCTIONAL ASSESSMENT: PAIN_FUNCTIONAL_ASSESSMENT: 0-10

## 2024-01-24 NOTE — PROGRESS NOTES
"Physical Therapy    Physical Therapy Treatment    Patient Name: Kathryn Araujo  MRN: [id number]  : 1955  Today's Date: 2024  Time Calculation  Start Time: 1119  Stop Time: 1215  Time Calculation (min): 56 min    Visit: 23   (24-3/1/24)    Assessment:   Single leg balance/proprioception is improving.  Plan:    Progress PT working on restoring full right ankle ROM, proprioception and normalize gait.        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 Pain of right heel  Follow Up In Physical Therapy          Subjective   Pts. Reports that she is able to walk a little faster, but still feels some pulling in achilles if she walks too fast.    Precautions  Precautions  Precautions Comment: see protocolPrecautions Comment: See Dr Ivey post op protocol    Pain  Pain Assessment: 0-10  Pain Score: 0 - No pain    Objective   AROM right ankle  Dorsiflexion = 8 degrees  Plantarflexion = 47 degrees  Inversion = 34 degrees  Eversion = 13 degrees    Gait - no antalgic limp    Right SLS - 30 seconds     Treatments:  EXERCISES Date 24                         Towel scrunches with toes              SLS Balance   13\", 38\"    Step-ups 8\" x20 each      4 stairs 5x 8x no UE     Nu step with heel push only (in boot) L5  10 mins L5 10 minutes L5  10 mins             Eccentric heel raises              STM right calf and achilles       Stairs (4)              Baps Board  L3 30 ea L3 30 L3  30X    Rockerboard DF/PF/INV/EVR standing 30x each 30x each 30X    Rockerboard balance        Bilateral hamstring curls 45#    3 X 20 45# 3x20 45#  3 X 20    Bilat knee extension 25#  3 X 20      Qhip flexion 30# 2x10 30# 2x10 40#   2 X 10    Qhip abduction 30# 2x10 30#2x10 40#   2 X 10    Shuttle DLP 6B  3 X 15 6B 3x15 6B  3 X 15    Shuttle SLP 4B  3 X 15 4B 3x15 4B  3 X 15    Shuttle DTR(keep resistance low) 4B  30x 4B30x 4B  3 X 10    Standing active DF       Calf stretch 30\"Hx3 " "30\"H x3            PROM right ankle              CP right ankle       HEP          Goals:   1.Progress to FWB in boot and wean heel lifts in boot per protocol-6 weeks-goal met    2.Discontinue boot and gradually wean into regular supportive shoe-12 weeks-goal met    3.Full PROM of right qczjewjb-7-68 weeks   12/11/2023 progressing    4.Improve LEFS score > 40 to facilitate return to prior level of function-12-16 weeks-goal met    5.Pt will be able to ambulate community distances without deviation or pain-12 weeks    12/11/2023 progressin      "

## 2024-01-26 DIAGNOSIS — I10 HYPERTENSION, UNSPECIFIED TYPE: ICD-10-CM

## 2024-01-26 RX ORDER — CHLORTHALIDONE 25 MG/1
25 TABLET ORAL DAILY
Qty: 90 TABLET | Refills: 1 | Status: SHIPPED | OUTPATIENT
Start: 2024-01-26 | End: 2024-05-08

## 2024-01-29 ENCOUNTER — TREATMENT (OUTPATIENT)
Dept: PHYSICAL THERAPY | Facility: CLINIC | Age: 69
End: 2024-01-29
Payer: MEDICARE

## 2024-01-29 DIAGNOSIS — M92.60 HAGLUND'S DEFORMITY: ICD-10-CM

## 2024-01-29 DIAGNOSIS — M79.671 PAIN OF RIGHT HEEL: ICD-10-CM

## 2024-01-29 PROCEDURE — 97110 THERAPEUTIC EXERCISES: CPT | Mod: GP | Performed by: PHYSICAL THERAPIST

## 2024-01-29 ASSESSMENT — PAIN SCALES - GENERAL: PAINLEVEL_OUTOF10: 0 - NO PAIN

## 2024-01-29 ASSESSMENT — PAIN - FUNCTIONAL ASSESSMENT: PAIN_FUNCTIONAL_ASSESSMENT: 0-10

## 2024-01-29 NOTE — PROGRESS NOTES
Physical Therapy    Physical Therapy Treatment/Reassessment    Patient Name: Kathryn Araujo  MRN: [id number]  : 1955  Today's Date: 2024  Time Calculation  Start Time: 940  Stop Time: 0  Time Calculation (min): 60 min    Visit:  (24-3/1/24)    Assessment:   Pt has made excellent progress with right ankle ROM, strength, proprioception and gait. Pt needs continued PT to normalize gait monica and pattern for community distance and to improve right ankle stability when ambulating on uneven surfaces and when holding her grandchild.  Plan:    Progress PT working on restoring full right ankle ROM, proprioception and normalize gait. Request more PT visits from insurance to work on normalizing speed of gait and the ability to carry her grandchild when walking.       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 Pain of right heel  Follow Up In Physical Therapy          Subjective   Pts. Reports that her achilles feels good, but she has had some LBP over the past few days. Feels that the speed of her gait is gradually improving without a limp for household distances. Continues to have slower speed and shortened stride length when ambulating community distances. Has not felt comfortable ambulating while holding her grandchild yet.    Precautions  Precautions  Precautions Comment: see protocolPrecautions Comment: See Dr Ivey post op protocol    Pain  Pain Assessment: 0-10  Pain Score: 0 - No pain  Pain Location: Ankle  Pain Orientation: Right    Objective   AROM right ankle  Dorsiflexion = 10 degrees  Plantarflexion = 54 degrees  Inversion = 34 degrees  Eversion = 20 degrees    Right ankle strength:   DF 5/5  PF 5/5  INV 5/5  EVER 4+/5    Gait - no antalgic limp, but unable to ambulate at same speed she did prior to surgery when in the clinic. When patient is ambulating community distances she notices shortened stride length on right LE and decreased monica.    Right  "SLS - 38 seconds    OUTCOME MEASURE  LEFS  63/80     Treatments:  EXERCISES Date 1/19/24 1/22/24 1/24/24 1/29/24                        Towel scrunches with toes              SLS Balance   13\", 38\" 13\", 38\"   Step-ups 8\" x20 each      4 stairs 5x 8x no UE     Nu step with heel push only (in boot) L5  10 mins L5 10 minutes L5  10 mins   L5  10 mins          Eccentric heel raises              STM right calf and achilles       Stairs (4)              Baps Board  L3 30 ea L3 30 L3  30X L4  30X   Rockerboard DF/PF/INV/EVR standing 30x each 30x each 30X 30X   Rockerboard balance        Bilateral hamstring curls 45#    3 X 20 45# 3x20 45#  3 X 20 45# 3 X 20   Bilat knee extension 25#  3 X 20   25#  3 X 20   Qhip flexion 30# 2x10 30# 2x10 40#   2 X 10 40#  2 X 10   Qhip abduction 30# 2x10 30#2x10 40#   2 X 10 40#  2 X10   Shuttle DLP 6B  3 X 15 6B 3x15 6B  3 X 15 6B  3 X 15   Shuttle SLP 4B  3 X 15 4B 3x15 4B  3 X 15 4B  3 X 15   Shuttle DTR(keep resistance low) 4B  30x 4B30x 4B  3 X 10 4B  3 X 10   Standing active DF       Calf stretch 30\"Hx3 30\"H x3  30\"H  X 3          PROM right ankle              CP right ankle       HEP          Goals:   1.Progress to FWB in boot and wean heel lifts in boot per protocol-6 weeks-goal met    2.Discontinue boot and gradually wean into regular supportive shoe-12 weeks-goal met    3.Full PROM of right obrqsjia-6-18 week-goal met    4.Improve LEFS score > 40 to facilitate return to prior level of function-12-16 weeks-goal met    5.Pt will be able to ambulate community distances without deviation or pain-12 weeks    1/29/24 progressing    NEW GOALS 1/29/24    6.Pt will have 60\" single leg standing balance on right LE to improve stability so she feels comfortable carrying her grandchild-2/29/24        "

## 2024-02-05 ENCOUNTER — OFFICE VISIT (OUTPATIENT)
Dept: PRIMARY CARE | Facility: CLINIC | Age: 69
End: 2024-02-05
Payer: MEDICARE

## 2024-02-05 VITALS
HEART RATE: 80 BPM | BODY MASS INDEX: 40.48 KG/M2 | WEIGHT: 220 LBS | TEMPERATURE: 98.1 F | SYSTOLIC BLOOD PRESSURE: 112 MMHG | HEIGHT: 62 IN | DIASTOLIC BLOOD PRESSURE: 70 MMHG

## 2024-02-05 DIAGNOSIS — I10 HYPERTENSION, UNSPECIFIED TYPE: Primary | ICD-10-CM

## 2024-02-05 DIAGNOSIS — R73.9 HYPERGLYCEMIA: ICD-10-CM

## 2024-02-05 LAB
POC FINGERSTICK BLOOD GLUCOSE: 112 MG/DL (ref 70–100)
POC HEMOGLOBIN A1C: 6.4 % (ref 4.2–6.5)

## 2024-02-05 PROCEDURE — 1126F AMNT PAIN NOTED NONE PRSNT: CPT | Performed by: FAMILY MEDICINE

## 2024-02-05 PROCEDURE — 1159F MED LIST DOCD IN RCRD: CPT | Performed by: FAMILY MEDICINE

## 2024-02-05 PROCEDURE — 3008F BODY MASS INDEX DOCD: CPT | Performed by: FAMILY MEDICINE

## 2024-02-05 PROCEDURE — 83036 HEMOGLOBIN GLYCOSYLATED A1C: CPT | Performed by: FAMILY MEDICINE

## 2024-02-05 PROCEDURE — 3074F SYST BP LT 130 MM HG: CPT | Performed by: FAMILY MEDICINE

## 2024-02-05 PROCEDURE — 99213 OFFICE O/P EST LOW 20 MIN: CPT | Performed by: FAMILY MEDICINE

## 2024-02-05 PROCEDURE — 82962 GLUCOSE BLOOD TEST: CPT | Performed by: FAMILY MEDICINE

## 2024-02-05 PROCEDURE — 3078F DIAST BP <80 MM HG: CPT | Performed by: FAMILY MEDICINE

## 2024-02-05 PROCEDURE — 1036F TOBACCO NON-USER: CPT | Performed by: FAMILY MEDICINE

## 2024-02-05 NOTE — PROGRESS NOTES
"Subjective   Patient ID: Kathryn Araujo is a 68 y.o. female who presents for Follow-up (1 month follow up on meds ).    HPI   When she was on the half tablet her bps were good. A couple times she felt chest heaviness and heart racing and a little numbness in arm.  She is doing PT but that will be stopping soon. Goes exercising with sister at least once a week. Will be watching granddaughter a couple times a week.  Review of Systems   All other systems reviewed and are negative.      Objective   /70   Pulse 80   Temp 36.7 °C (98.1 °F)   Ht 1.575 m (5' 2\")   Wt 99.8 kg (220 lb)   BMI 40.24 kg/m²     Physical Exam  Constitutional:       Appearance: Normal appearance.   HENT:      Head: Normocephalic.      Right Ear: Tympanic membrane normal.      Nose: Nose normal.      Mouth/Throat:      Mouth: Mucous membranes are moist.   Eyes:      Pupils: Pupils are equal, round, and reactive to light.   Cardiovascular:      Rate and Rhythm: Normal rate and regular rhythm.      Pulses: Normal pulses.   Pulmonary:      Effort: Pulmonary effort is normal.   Abdominal:      General: Abdomen is flat.   Musculoskeletal:         General: Normal range of motion.      Cervical back: Normal range of motion.   Skin:     General: Skin is warm.   Neurological:      Mental Status: She is alert.   Psychiatric:         Mood and Affect: Mood normal.       Assessment/Plan   Diagnoses and all orders for this visit:  Hypertension, unspecified type  Rec cont half tab on chlorthalidone  Hyperglycemia  -     POCT glycosylated hemoglobin (Hb A1C) manually resulted  -     POCT Fingerstick Glucose manually resulted  BMI 40.0-44.9, adult (CMS/HCC)       "

## 2024-02-06 ENCOUNTER — APPOINTMENT (OUTPATIENT)
Dept: PHYSICAL THERAPY | Facility: CLINIC | Age: 69
End: 2024-02-06
Payer: MEDICARE

## 2024-02-12 ENCOUNTER — DOCUMENTATION (OUTPATIENT)
Dept: PHYSICAL THERAPY | Facility: CLINIC | Age: 69
End: 2024-02-12
Payer: MEDICARE

## 2024-02-12 DIAGNOSIS — M79.671 PAIN OF RIGHT HEEL: ICD-10-CM

## 2024-02-12 DIAGNOSIS — M92.60 HAGLUND'S DEFORMITY: Primary | ICD-10-CM

## 2024-02-12 NOTE — PROGRESS NOTES
Physical Therapy    Discharge Summary    Name: Kathryn Araujo  MRN: [id number]  : 1955  Date: 24    Discharge Summary: PT    Discharge Information: Date of discharge 24, Date of last visit 24, Date of evaluation 10/18/23, Number of attended visits 24, Referred by Dr. Ivey, and Referred for Achilles Repair    Therapy Summary: Overall patient was doing very well at the time of her last visit. Only deficit being that she was unable to ambulate at faster speeds without deviation of gait pattern.    Discharge Status: Discharged to independent [facility]     Rehab Discharge Reason: Patient requested due to financial and/or insurance constraints   Qbrexza Pregnancy And Lactation Text: There is no available data on Qbrexza use in pregnant women.  There is no available data on Qbrexza use in lactation.

## 2024-02-13 ENCOUNTER — APPOINTMENT (OUTPATIENT)
Dept: PHYSICAL THERAPY | Facility: CLINIC | Age: 69
End: 2024-02-13
Payer: MEDICARE

## 2024-03-18 ENCOUNTER — OFFICE VISIT (OUTPATIENT)
Dept: ORTHOPEDIC SURGERY | Facility: CLINIC | Age: 69
End: 2024-03-18
Payer: MEDICARE

## 2024-03-18 DIAGNOSIS — M92.60 HAGLUND'S DEFORMITY: Primary | ICD-10-CM

## 2024-03-18 PROCEDURE — 1159F MED LIST DOCD IN RCRD: CPT | Performed by: STUDENT IN AN ORGANIZED HEALTH CARE EDUCATION/TRAINING PROGRAM

## 2024-03-18 PROCEDURE — 99213 OFFICE O/P EST LOW 20 MIN: CPT | Performed by: STUDENT IN AN ORGANIZED HEALTH CARE EDUCATION/TRAINING PROGRAM

## 2024-03-18 PROCEDURE — 1036F TOBACCO NON-USER: CPT | Performed by: STUDENT IN AN ORGANIZED HEALTH CARE EDUCATION/TRAINING PROGRAM

## 2024-03-18 PROCEDURE — 1160F RVW MEDS BY RX/DR IN RCRD: CPT | Performed by: STUDENT IN AN ORGANIZED HEALTH CARE EDUCATION/TRAINING PROGRAM

## 2024-03-18 PROCEDURE — 3008F BODY MASS INDEX DOCD: CPT | Performed by: STUDENT IN AN ORGANIZED HEALTH CARE EDUCATION/TRAINING PROGRAM

## 2024-03-18 NOTE — PROGRESS NOTES
PRIMARY CARE PHYSICIAN: Faviola Meier MD    ORTHOPAEDIC POSTOPERATIVE VISIT    ASSESSMENT & PLAN    Impression: 68 y.o. female 6 months postop s/p right calcaneus excision of Benito's deformity and Achilles repair done on 09/22/2023.    Plan:   Overall she is doing very well.  She will continue to progress through the postoperative protocol.  She understands her postoperative precautions.  She will continue working on her strength and range of motion.    I reviewed their postoperative timeline and plan with them. They understand the postoperative precautions and the treatment plan going forward.     Follow-Up: Patient will follow-up as needed  At the end of the visit, all questions were answered in full. The patient is in agreement with the plan and recommendations. They will call the office with any questions/concerns.      Note dictated with [employer]wl[employer] ??? software. Completed without full typed error editing and sent to avoid delay.      SUBJECTIVE    HPI: Kathryn Araujo is a 68 y.o. patient and is now 6 months postop s/p right calcaneus excision of Benito's deformity and Achilles repair.  She denies any pain and has been adhering to her postoperative precautions.  She has been working with physical therapy through the postoperative protocol for the above.  No complaints at this time.    REVIEW OF SYSTEMS  Constitutional: See HPI for pain assessment, No significant weight loss, recent trauma  Cardiovascular: No chest pain, shortness of breath  Respiratory: No difficulty breathing, cough  Gastrointestinal: No nausea, vomiting, diarrhea, constipation  Musculoskeletal: Noted in HPI, positive for pain, restricted motion, stiffness  Integumentary: No rashes, easy bruising, redness   Neurological: no numbness or tingling in extremities, no gait disturbances   Psychiatric: No mood changes, memory changes, social issues  Heme/Lymph: no excessive swelling, easy bruising, excessive bleeding  ENT: No  hearing changes  Eyes: No vision changes    No past medical history on file.     No Known Allergies     Past Surgical History:   Procedure Laterality Date    ACHILLES TENDON SURGERY      BREAST LUMPECTOMY Right     CATARACT EXTRACTION  09/10/2018    Cataract Surgery     SECTION, CLASSIC  09/10/2018     Section        No family history on file.     Social History     Socioeconomic History    Marital status:      Spouse name: Not on file    Number of children: Not on file    Years of education: Not on file    Highest education level: Not on file   Occupational History    Not on file   Tobacco Use    Smoking status: Former     Types: Cigarettes     Passive exposure: Past    Smokeless tobacco: Never   Substance and Sexual Activity    Alcohol use: Yes    Drug use: Never    Sexual activity: Not on file   Other Topics Concern    Not on file   Social History Narrative    Not on file     Social Determinants of Health     Financial Resource Strain: Not on file   Food Insecurity: Not on file   Transportation Needs: Not on file   Physical Activity: Not on file   Stress: Not on file   Social Connections: Not on file   Intimate Partner Violence: Not on file   Housing Stability: Not on file        CURRENT MEDICATIONS:   Current Outpatient Medications   Medication Sig Dispense Refill    acetaminophen (Tylenol) 500 mg tablet Take 2 tablets (1,000 mg) by mouth every 8 hours if needed for mild pain (1 - 3).      aspirin 81 mg EC tablet Take 1 tablet (81 mg) by mouth every 12 hours.      atorvastatin (Lipitor) 40 mg tablet Take 1 tablet (40 mg) by mouth once daily at bedtime.      busPIRone (Buspar) 5 mg tablet Take by mouth.      carvedilol (Coreg) 6.25 mg tablet Take 1 tablet (6.25 mg) by mouth 2 times a day.      chlorthalidone (Hygroton) 25 mg tablet TAKE 1 TABLET BY MOUTH EVERY DAY 90 tablet 1    DOCOSAHEXAENOIC ACID-EPA ORAL Take by mouth.      lisinopril 40 mg tablet Take 1 tablet (40 mg) by mouth once  "daily. 30 tablet 5     No current facility-administered medications for this visit.        OBJECTIVE    PHYSICAL EXAM      10/4/2023     2:43 PM 11/6/2023     8:14 AM 11/6/2023     2:10 PM 12/5/2023     3:59 PM 12/18/2023    11:55 AM 1/4/2024     3:18 PM 2/5/2024    11:12 AM   Vitals   Systolic  126  144  142 112   Diastolic  80  110  82 70   Heart Rate  82  78  78 80   Temp    36.3 °C (97.4 °F)  36.1 °C (96.9 °F) 36.7 °C (98.1 °F)   Height (in) 1.575 m (5' 2\") 1.575 m (5' 2\") 1.575 m (5' 2\")  1.575 m (5' 2\") 1.575 m (5' 2\") 1.575 m (5' 2\")   Weight (lb) 236 233 233 232 232 230 220   BMI 43.16 kg/m2 42.62 kg/m2 42.62 kg/m2 42.43 kg/m2 42.43 kg/m2 42.07 kg/m2 40.24 kg/m2   BSA (m2) 2.16 m2 2.15 m2 2.15 m2 2.14 m2 2.14 m2 2.13 m2 2.09 m2   Visit Report Report Report    Report Report    Report Report Report Report Report      There is no height or weight on file to calculate BMI.    General: Well-appearing, no acute distress    Skin intact bilateral upper and lower extremities  No erythema  No warmth    Detailed examination of right ankle demonstrates:  Surgical incision well-healed  No erythema or warmth  No ecchymosis or soft tissue swelling  Gentle ankle range of motion: Full and symmetric  Achilles tendon is palpable and intact  Negative Gutierrez test  Lower extremity motor grossly intact  L4-S1 sensation intact bilaterally  2+ DP/PT pulses bilaterally  Warm and well-perfused, brisk capillary refill    IMAGING:   No new imaging      Salvatore Ivey MD  Attending Surgeon    Sports Medicine Orthopaedic Surgery  [facility] [place]  [employer] [facility] [employer]         "

## 2024-03-26 DIAGNOSIS — I15.8 OTHER SECONDARY HYPERTENSION: Primary | ICD-10-CM

## 2024-03-27 RX ORDER — CARVEDILOL 6.25 MG/1
6.25 TABLET ORAL 2 TIMES DAILY
Qty: 180 TABLET | Refills: 0 | Status: SHIPPED | OUTPATIENT
Start: 2024-03-27

## 2024-04-22 ENCOUNTER — HOSPITAL ENCOUNTER (OUTPATIENT)
Dept: RADIOLOGY | Facility: CLINIC | Age: 69
Discharge: HOME | End: 2024-04-22
Payer: MEDICARE

## 2024-04-22 DIAGNOSIS — Z78.0 MENOPAUSE: ICD-10-CM

## 2024-04-22 PROCEDURE — 77080 DXA BONE DENSITY AXIAL: CPT

## 2024-04-22 PROCEDURE — 77080 DXA BONE DENSITY AXIAL: CPT | Performed by: RADIOLOGY

## 2024-04-23 PROBLEM — M85.89 OSTEOPENIA OF MULTIPLE SITES: Status: ACTIVE | Noted: 2024-04-23

## 2024-05-08 ENCOUNTER — OFFICE VISIT (OUTPATIENT)
Dept: PRIMARY CARE | Facility: CLINIC | Age: 69
End: 2024-05-08
Payer: MEDICARE

## 2024-05-08 VITALS
SYSTOLIC BLOOD PRESSURE: 118 MMHG | WEIGHT: 223 LBS | RESPIRATION RATE: 16 BRPM | DIASTOLIC BLOOD PRESSURE: 82 MMHG | HEART RATE: 85 BPM | BODY MASS INDEX: 41.04 KG/M2 | HEIGHT: 62 IN | OXYGEN SATURATION: 97 % | TEMPERATURE: 96.7 F

## 2024-05-08 DIAGNOSIS — E78.2 MIXED HYPERLIPIDEMIA: Primary | ICD-10-CM

## 2024-05-08 DIAGNOSIS — I10 HYPERTENSION, UNSPECIFIED TYPE: ICD-10-CM

## 2024-05-08 PROCEDURE — 1159F MED LIST DOCD IN RCRD: CPT | Performed by: FAMILY MEDICINE

## 2024-05-08 PROCEDURE — 1160F RVW MEDS BY RX/DR IN RCRD: CPT | Performed by: FAMILY MEDICINE

## 2024-05-08 PROCEDURE — 3008F BODY MASS INDEX DOCD: CPT | Performed by: FAMILY MEDICINE

## 2024-05-08 PROCEDURE — 3079F DIAST BP 80-89 MM HG: CPT | Performed by: FAMILY MEDICINE

## 2024-05-08 PROCEDURE — 3074F SYST BP LT 130 MM HG: CPT | Performed by: FAMILY MEDICINE

## 2024-05-08 PROCEDURE — 99213 OFFICE O/P EST LOW 20 MIN: CPT | Performed by: FAMILY MEDICINE

## 2024-05-08 RX ORDER — ATORVASTATIN CALCIUM 40 MG/1
40 TABLET, FILM COATED ORAL NIGHTLY
Qty: 90 TABLET | Refills: 1 | Status: SHIPPED | OUTPATIENT
Start: 2024-05-08

## 2024-05-08 NOTE — PROGRESS NOTES
"Subjective   Patient ID: Kathryn Araujo is a 68 y.o. female who presents for Medication Review  and Dizziness.    Dizziness       [place]ercising at the [employer] once a week. Babysits 2 times a week.  Needs refill.   April 26 saw cardio. Still has mumur. Echo was done oct 25 2023. Everything stable.  Sometimes gets lightheaded huber when looks up for too long  Review of Systems   All other systems reviewed and are negative.      Objective   /82 (BP Location: Right arm, Patient Position: Sitting, BP Cuff Size: Adult)   Pulse 85   Temp 35.9 °C (96.7 °F) (Temporal)   Resp 16   Ht 1.575 m (5' 2\")   Wt 101 kg (223 lb)   SpO2 97%   BMI 40.79 kg/m²     Physical Exam  Constitutional:       Appearance: Normal appearance.   HENT:      Head: Normocephalic.      Right Ear: Tympanic membrane normal.      Nose: Nose normal.      Mouth/Throat:      Mouth: Mucous membranes are moist.   Eyes:      Pupils: Pupils are equal, round, and reactive to light.   Cardiovascular:      Rate and Rhythm: Normal rate and regular rhythm.      Pulses: Normal pulses.   Pulmonary:      Effort: Pulmonary effort is normal.   Abdominal:      General: Abdomen is flat.   Musculoskeletal:         General: Normal range of motion.      Cervical back: Normal range of motion.   Skin:     General: Skin is warm.   Neurological:      Mental Status: She is alert.   Psychiatric:         Mood and Affect: Mood normal.         Assessment/Plan   Diagnoses and all orders for this visit:  Mixed hyperlipidemia  -     atorvastatin (Lipitor) 40 mg tablet; Take 1 tablet (40 mg) by mouth once daily at bedtime.  Hypertension, unspecified type  -     chlorthalidone (THALITON) 15 mg tablet; Take 1 tablet (15 mg) by mouth once daily.  Decreased from 25mg. Rtc 1 month with  bp readings to see if tolerating adjustment. Call if any issues       "

## 2024-06-10 ENCOUNTER — APPOINTMENT (OUTPATIENT)
Dept: PRIMARY CARE | Facility: CLINIC | Age: 69
End: 2024-06-10
Payer: MEDICARE

## 2024-06-23 DIAGNOSIS — I15.8 OTHER SECONDARY HYPERTENSION: ICD-10-CM

## 2024-06-24 RX ORDER — CARVEDILOL 6.25 MG/1
TABLET ORAL
Qty: 180 TABLET | Refills: 0 | Status: SHIPPED | OUTPATIENT
Start: 2024-06-24

## 2024-08-20 DIAGNOSIS — I10 HYPERTENSION, UNSPECIFIED TYPE: ICD-10-CM

## 2024-08-20 RX ORDER — CHLORTHALIDONE 25 MG/1
25 TABLET ORAL DAILY
Qty: 90 TABLET | Refills: 1 | Status: SHIPPED | OUTPATIENT
Start: 2024-08-20

## 2024-09-13 DIAGNOSIS — I10 HYPERTENSION, UNSPECIFIED TYPE: ICD-10-CM

## 2024-09-13 RX ORDER — LISINOPRIL 40 MG/1
40 TABLET ORAL DAILY
Qty: 90 TABLET | Refills: 1 | Status: SHIPPED | OUTPATIENT
Start: 2024-09-13

## 2024-09-15 DIAGNOSIS — I15.8 OTHER SECONDARY HYPERTENSION: ICD-10-CM

## 2024-09-16 RX ORDER — CARVEDILOL 6.25 MG/1
TABLET ORAL
Qty: 180 TABLET | Refills: 0 | Status: SHIPPED | OUTPATIENT
Start: 2024-09-16

## 2024-12-02 ENCOUNTER — APPOINTMENT (OUTPATIENT)
Dept: PRIMARY CARE | Facility: CLINIC | Age: 69
End: 2024-12-02
Payer: MEDICARE

## 2024-12-02 VITALS
SYSTOLIC BLOOD PRESSURE: 130 MMHG | HEART RATE: 88 BPM | OXYGEN SATURATION: 97 % | DIASTOLIC BLOOD PRESSURE: 80 MMHG | BODY MASS INDEX: 41.11 KG/M2 | WEIGHT: 223.4 LBS | TEMPERATURE: 97.3 F | HEIGHT: 62 IN

## 2024-12-02 DIAGNOSIS — Z00.00 ROUTINE GENERAL MEDICAL EXAMINATION AT HEALTH CARE FACILITY: Primary | ICD-10-CM

## 2024-12-02 DIAGNOSIS — I50.9 OTHER CONGESTIVE HEART FAILURE: ICD-10-CM

## 2024-12-02 DIAGNOSIS — E78.5 HYPERLIPIDEMIA, UNSPECIFIED HYPERLIPIDEMIA TYPE: ICD-10-CM

## 2024-12-02 DIAGNOSIS — R73.9 HYPERGLYCEMIA: ICD-10-CM

## 2024-12-02 DIAGNOSIS — Z12.31 ENCOUNTER FOR SCREENING MAMMOGRAM FOR MALIGNANT NEOPLASM OF BREAST: ICD-10-CM

## 2024-12-02 DIAGNOSIS — I10 HYPERTENSION, UNSPECIFIED TYPE: ICD-10-CM

## 2024-12-02 PROCEDURE — 1124F ACP DISCUSS-NO DSCNMKR DOCD: CPT | Performed by: FAMILY MEDICINE

## 2024-12-02 PROCEDURE — 1160F RVW MEDS BY RX/DR IN RCRD: CPT | Performed by: FAMILY MEDICINE

## 2024-12-02 PROCEDURE — 3079F DIAST BP 80-89 MM HG: CPT | Performed by: FAMILY MEDICINE

## 2024-12-02 PROCEDURE — 1159F MED LIST DOCD IN RCRD: CPT | Performed by: FAMILY MEDICINE

## 2024-12-02 PROCEDURE — 1170F FXNL STATUS ASSESSED: CPT | Performed by: FAMILY MEDICINE

## 2024-12-02 PROCEDURE — 1036F TOBACCO NON-USER: CPT | Performed by: FAMILY MEDICINE

## 2024-12-02 PROCEDURE — 3008F BODY MASS INDEX DOCD: CPT | Performed by: FAMILY MEDICINE

## 2024-12-02 PROCEDURE — G0439 PPPS, SUBSEQ VISIT: HCPCS | Performed by: FAMILY MEDICINE

## 2024-12-02 PROCEDURE — 3075F SYST BP GE 130 - 139MM HG: CPT | Performed by: FAMILY MEDICINE

## 2024-12-02 RX ORDER — MULTIVITAMIN
1 TABLET ORAL DAILY
COMMUNITY

## 2024-12-02 RX ORDER — CHLORTHALIDONE 25 MG/1
12.5 TABLET ORAL DAILY
COMMUNITY

## 2024-12-02 RX ORDER — ATORVASTATIN CALCIUM 20 MG/1
20 TABLET, FILM COATED ORAL DAILY
COMMUNITY

## 2024-12-02 ASSESSMENT — ACTIVITIES OF DAILY LIVING (ADL)
DRESSING: INDEPENDENT
GROCERY_SHOPPING: INDEPENDENT
MANAGING_FINANCES: INDEPENDENT
DOING_HOUSEWORK: INDEPENDENT
BATHING: INDEPENDENT
TAKING_MEDICATION: INDEPENDENT

## 2024-12-02 NOTE — PROGRESS NOTES
"Subjective   Reason for Visit: Kathryn Araujo is an 69 y.o. female here for a Medicare Wellness visit.     Past Medical, Surgical, and Family History reviewed and updated in chart.    Reviewed all medications by prescribing practitioner or clinical pharmacist (such as prescriptions, OTCs, herbal therapies and supplements) and documented in the medical record.    HPI  Goes to [facility] 2 times a week and watches her granddaughter and grandson once a week.  Patient Care Team:  Faviola Meier MD as PCP - General (Family Medicine)  Faviola Meier MD as PCP - [employer] PCP     Review of Systems   All other systems reviewed and are negative.      Objective   Vitals:  /80 (BP Location: Left arm, Patient Position: Sitting, BP Cuff Size: Large adult)   Pulse 88   Temp 36.3 °C (97.3 °F) (Temporal)   Ht 1.581 m (5' 2.25\")   Wt 101 kg (223 lb 6.4 oz)   SpO2 97%   BMI 40.53 kg/m²       Physical Exam  Constitutional:       Appearance: Normal appearance.   HENT:      Head: Normocephalic.      Right Ear: Tympanic membrane normal.      Nose: Nose normal.      Mouth/Throat:      Mouth: Mucous membranes are moist.   Eyes:      Pupils: Pupils are equal, round, and reactive to light.   Cardiovascular:      Rate and Rhythm: Normal rate and regular rhythm.      Pulses: Normal pulses.   Pulmonary:      Effort: Pulmonary effort is normal.   Abdominal:      General: Abdomen is flat.   Musculoskeletal:         General: Normal range of motion.      Cervical back: Normal range of motion.   Skin:     General: Skin is warm.   Neurological:      Mental Status: She is alert.   Psychiatric:         Mood and Affect: Mood normal.         Assessment & Plan              "

## 2024-12-12 DIAGNOSIS — I15.8 OTHER SECONDARY HYPERTENSION: ICD-10-CM

## 2024-12-12 RX ORDER — CARVEDILOL 6.25 MG/1
TABLET ORAL
Qty: 180 TABLET | Refills: 0 | Status: SHIPPED | OUTPATIENT
Start: 2024-12-12

## 2024-12-16 ENCOUNTER — TELEPHONE (OUTPATIENT)
Dept: PRIMARY CARE | Facility: CLINIC | Age: 69
End: 2024-12-16
Payer: MEDICARE

## 2024-12-16 NOTE — TELEPHONE ENCOUNTER
ALT  Order: [id number]  Component  Ref Range & Units 1 mo ago   ALT  0 - 34 U/L 71 High    Resulting Agency [facility]     Specimen Collected: 10/22/24 10:05    Performed by: [facility] Last Resulted: 10/22/24 12:03   Received From: [facility]  Result Received: 12/02/24 09:00

## 2024-12-16 NOTE — TELEPHONE ENCOUNTER
----- Message from Faviola Meier sent at 12/16/2024  1:07 PM EST -----  Need the rest of the liver enzymes. Ast high. I was waiting for the other labs I ordered but I dont see them. Can you obtain pls? ty  ----- Message -----  From: Cate Hilliard  Sent: 12/2/2024  10:01 AM EST  To: Faviola Meier MD    Labs from [facility]

## 2025-03-08 DIAGNOSIS — I10 HYPERTENSION, UNSPECIFIED TYPE: ICD-10-CM

## 2025-03-09 DIAGNOSIS — I15.8 OTHER SECONDARY HYPERTENSION: ICD-10-CM

## 2025-03-10 RX ORDER — LISINOPRIL 40 MG/1
40 TABLET ORAL DAILY
Qty: 90 TABLET | Refills: 1 | Status: SHIPPED | OUTPATIENT
Start: 2025-03-10

## 2025-03-10 RX ORDER — CARVEDILOL 6.25 MG/1
TABLET ORAL
Qty: 180 TABLET | Refills: 0 | Status: SHIPPED | OUTPATIENT
Start: 2025-03-10

## 2025-03-14 DIAGNOSIS — N30.90 CYSTITIS: Primary | ICD-10-CM

## 2025-03-14 LAB
ALBUMIN SERPL-MCNC: 3.9 G/DL (ref 3.6–5.1)
ALP SERPL-CCNC: 93 U/L (ref 37–153)
ALT SERPL-CCNC: 60 U/L (ref 6–29)
ANION GAP SERPL CALCULATED.4IONS-SCNC: 9 MMOL/L (CALC) (ref 7–17)
APPEARANCE UR: ABNORMAL
AST SERPL-CCNC: 65 U/L (ref 10–35)
BACTERIA #/AREA URNS HPF: ABNORMAL /HPF
BASOPHILS # BLD AUTO: 58 CELLS/UL (ref 0–200)
BASOPHILS NFR BLD AUTO: 0.9 %
BILIRUB SERPL-MCNC: 1.1 MG/DL (ref 0.2–1.2)
BILIRUB UR QL STRIP: NEGATIVE
BUN SERPL-MCNC: 14 MG/DL (ref 7–25)
CALCIUM SERPL-MCNC: 9.1 MG/DL (ref 8.6–10.4)
CHLORIDE SERPL-SCNC: 99 MMOL/L (ref 98–110)
CHOLEST SERPL-MCNC: 147 MG/DL
CHOLEST/HDLC SERPL: 2.7 (CALC)
CO2 SERPL-SCNC: 29 MMOL/L (ref 20–32)
COLOR UR: YELLOW
CREAT SERPL-MCNC: 0.88 MG/DL (ref 0.5–1.05)
EGFRCR SERPLBLD CKD-EPI 2021: 72 ML/MIN/1.73M2
EOSINOPHIL # BLD AUTO: 38 CELLS/UL (ref 15–500)
EOSINOPHIL NFR BLD AUTO: 0.6 %
ERYTHROCYTE [DISTWIDTH] IN BLOOD BY AUTOMATED COUNT: 12.2 % (ref 11–15)
EST. AVERAGE GLUCOSE BLD GHB EST-MCNC: 123 MG/DL
EST. AVERAGE GLUCOSE BLD GHB EST-SCNC: 6.8 MMOL/L
GLUCOSE SERPL-MCNC: 97 MG/DL (ref 65–99)
GLUCOSE UR QL STRIP: NEGATIVE
HBA1C MFR BLD: 5.9 % OF TOTAL HGB
HCT VFR BLD AUTO: 45.1 % (ref 35–45)
HDLC SERPL-MCNC: 54 MG/DL
HGB BLD-MCNC: 15.2 G/DL (ref 11.7–15.5)
HGB UR QL STRIP: NEGATIVE
HYALINE CASTS #/AREA URNS LPF: ABNORMAL /LPF
KETONES UR QL STRIP: NEGATIVE
LDLC SERPL CALC-MCNC: 74 MG/DL (CALC)
LEUKOCYTE ESTERASE UR QL STRIP: ABNORMAL
LYMPHOCYTES # BLD AUTO: 2067 CELLS/UL (ref 850–3900)
LYMPHOCYTES NFR BLD AUTO: 32.3 %
MCH RBC QN AUTO: 33.2 PG (ref 27–33)
MCHC RBC AUTO-ENTMCNC: 33.7 G/DL (ref 32–36)
MCV RBC AUTO: 98.5 FL (ref 80–100)
MONOCYTES # BLD AUTO: 518 CELLS/UL (ref 200–950)
MONOCYTES NFR BLD AUTO: 8.1 %
NEUTROPHILS # BLD AUTO: 3718 CELLS/UL (ref 1500–7800)
NEUTROPHILS NFR BLD AUTO: 58.1 %
NITRITE UR QL STRIP: NEGATIVE
NONHDLC SERPL-MCNC: 93 MG/DL (CALC)
PH UR STRIP: 8 [PH] (ref 5–8)
PLATELET # BLD AUTO: 176 THOUSAND/UL (ref 140–400)
PMV BLD REES-ECKER: 11.8 FL (ref 7.5–12.5)
POTASSIUM SERPL-SCNC: 3.8 MMOL/L (ref 3.5–5.3)
PROT SERPL-MCNC: 7.5 G/DL (ref 6.1–8.1)
PROT UR QL STRIP: NEGATIVE
RBC # BLD AUTO: 4.58 MILLION/UL (ref 3.8–5.1)
RBC #/AREA URNS HPF: ABNORMAL /HPF
SERVICE CMNT-IMP: ABNORMAL
SODIUM SERPL-SCNC: 137 MMOL/L (ref 135–146)
SP GR UR STRIP: 1.02 (ref 1–1.03)
SQUAMOUS #/AREA URNS HPF: ABNORMAL /HPF
TRIGL SERPL-MCNC: 107 MG/DL
TSH SERPL-ACNC: 2.67 MIU/L (ref 0.4–4.5)
WBC # BLD AUTO: 6.4 THOUSAND/UL (ref 3.8–10.8)
WBC #/AREA URNS HPF: ABNORMAL /HPF

## 2025-03-14 RX ORDER — AMOXICILLIN 500 MG/1
500 CAPSULE ORAL EVERY 12 HOURS SCHEDULED
Qty: 10 CAPSULE | Refills: 0 | Status: SHIPPED | OUTPATIENT
Start: 2025-03-14 | End: 2025-03-19

## 2025-05-08 DIAGNOSIS — I10 HYPERTENSION, UNSPECIFIED TYPE: Primary | ICD-10-CM

## 2025-05-08 RX ORDER — CHLORTHALIDONE 25 MG/1
25 TABLET ORAL DAILY
Qty: 90 TABLET | Refills: 1 | Status: SHIPPED | OUTPATIENT
Start: 2025-05-08

## 2025-06-05 DIAGNOSIS — I15.8 OTHER SECONDARY HYPERTENSION: ICD-10-CM

## 2025-06-05 RX ORDER — CARVEDILOL 6.25 MG/1
TABLET ORAL
Qty: 180 TABLET | Refills: 0 | Status: SHIPPED | OUTPATIENT
Start: 2025-06-05

## 2025-08-30 DIAGNOSIS — I10 HYPERTENSION, UNSPECIFIED TYPE: ICD-10-CM

## 2025-08-31 DIAGNOSIS — I15.8 OTHER SECONDARY HYPERTENSION: ICD-10-CM

## 2025-09-02 RX ORDER — LISINOPRIL 40 MG/1
40 TABLET ORAL DAILY
Qty: 90 TABLET | Refills: 1 | Status: SHIPPED | OUTPATIENT
Start: 2025-09-02

## 2025-09-02 RX ORDER — CARVEDILOL 6.25 MG/1
6.25 TABLET ORAL
Qty: 180 TABLET | Refills: 0 | Status: SHIPPED | OUTPATIENT
Start: 2025-09-02

## 2025-12-08 ENCOUNTER — APPOINTMENT (OUTPATIENT)
Dept: PRIMARY CARE | Facility: CLINIC | Age: 70
End: 2025-12-08
Payer: MEDICARE